# Patient Record
Sex: FEMALE | Race: WHITE | NOT HISPANIC OR LATINO | Employment: FULL TIME | URBAN - METROPOLITAN AREA
[De-identification: names, ages, dates, MRNs, and addresses within clinical notes are randomized per-mention and may not be internally consistent; named-entity substitution may affect disease eponyms.]

---

## 2018-05-23 ENCOUNTER — OFFICE VISIT (OUTPATIENT)
Dept: URGENT CARE | Facility: CLINIC | Age: 48
End: 2018-05-23
Payer: COMMERCIAL

## 2018-05-23 VITALS
HEIGHT: 61 IN | HEART RATE: 94 BPM | RESPIRATION RATE: 18 BRPM | WEIGHT: 150 LBS | DIASTOLIC BLOOD PRESSURE: 84 MMHG | BODY MASS INDEX: 28.32 KG/M2 | OXYGEN SATURATION: 98 % | SYSTOLIC BLOOD PRESSURE: 168 MMHG | TEMPERATURE: 97.8 F

## 2018-05-23 DIAGNOSIS — H61.22 IMPACTED CERUMEN OF LEFT EAR: Primary | ICD-10-CM

## 2018-05-23 PROCEDURE — 99283 EMERGENCY DEPT VISIT LOW MDM: CPT | Performed by: PHYSICIAN ASSISTANT

## 2018-05-23 PROCEDURE — G0382 LEV 3 HOSP TYPE B ED VISIT: HCPCS | Performed by: PHYSICIAN ASSISTANT

## 2018-05-23 RX ORDER — CHOLECALCIFEROL (VITAMIN D3) 125 MCG
3000 CAPSULE ORAL
COMMUNITY
End: 2020-02-26

## 2018-05-23 RX ORDER — FLUTICASONE PROPIONATE 50 MCG
1 SPRAY, SUSPENSION (ML) NASAL DAILY
COMMUNITY
End: 2020-02-26

## 2018-05-23 NOTE — PROGRESS NOTES
[facility]        NAME: Irvin Elias is a 50 y o  female  : 1970    MRN: [id number]  DATE: May 23, 2018  TIME: 1:08 PM    Assessment and Plan   Impacted cerumen of left ear [H61 22]  1  Impacted cerumen of left ear           Patient Instructions       Follow up with PCP in 3-5 days  Proceed to  ER if symptoms worsen  Chief Complaint     Chief Complaint   Patient presents with   Breanna Oms     left ear pain since last night with swelling         History of Present Illness       Ear pain and blocked up since last night      Earache    There is pain in the left ear  This is a new problem  The current episode started yesterday  The problem occurs constantly  The problem has been unchanged  There has been no fever  The pain is moderate  Associated symptoms include hearing loss  Pertinent negatives include no abdominal pain, coughing, diarrhea, ear discharge, headaches, neck pain, rash, rhinorrhea, sore throat or vomiting  Treatments tried: ear irrigation  The treatment provided no relief  Review of Systems   Review of Systems   Constitutional: Negative  HENT: Positive for ear pain and hearing loss  Negative for ear discharge, rhinorrhea and sore throat  Eyes: Negative  Respiratory: Negative  Negative for cough  Cardiovascular: Negative  Gastrointestinal: Negative for abdominal pain, diarrhea and vomiting  Musculoskeletal: Negative for neck pain  Skin: Negative  Negative for rash  Neurological: Negative for headaches           Current Medications       Current Outpatient Prescriptions:     fluticasone (FLONASE) 50 mcg/act nasal spray, 1 spray into each nostril daily, Disp: , Rfl:     lactase (LACTAID) 3,000 units tablet, Take 3,000 Units by mouth 3 (three) times a day with meals, Disp: , Rfl:     Current Allergies     Allergies as of 2018    (No Known Allergies)            The following portions of the patient's history were reviewed and updated as appropriate: allergies, current medications, past family history, past medical history, past social history, past surgical history and problem list      Past Medical History:   Diagnosis Date    Cervical cancer ([address] )        Past Surgical History:   Procedure Laterality Date    SHOULDER SURGERY Left        No family history on file  Medications have been verified  Objective   /84   Pulse 94   Temp 97 8 °F (36 6 °C) (Tympanic)   Resp 18   Ht 5' 1" (1 549 m)   Wt 68 kg (150 lb)   SpO2 98%   BMI 28 34 kg/m²          Physical Exam     Physical Exam   Constitutional: She is oriented to person, place, and time  She appears well-developed and well-nourished  No distress  HENT:   Head: Normocephalic and atraumatic  Right Ear: Hearing, tympanic membrane, external ear and ear canal normal    Left Ear: No lacerations  There is tenderness  No drainage or swelling  A foreign body (cerumin) is present  No mastoid tenderness  Tympanic membrane is not injected, not scarred, not perforated, not erythematous, not retracted and not bulging  No middle ear effusion  No hemotympanum  Decreased hearing is noted  Nose: Nose normal    Mouth/Throat: Oropharynx is clear and moist  No oropharyngeal exudate  Eyes: Conjunctivae are normal    Neck: Normal range of motion  Neck supple  Cardiovascular: Normal rate, regular rhythm, normal heart sounds and intact distal pulses  Pulmonary/Chest: Effort normal and breath sounds normal  No respiratory distress  Abdominal: Soft  Musculoskeletal: Normal range of motion  Lymphadenopathy:     She has no cervical adenopathy  Neurological: She is alert and oriented to person, place, and time  Skin: Skin is warm and dry  Psychiatric: She has a normal mood and affect  Nursing note and vitals reviewed              Ear cerumen removal  Date/Time: 5/23/2018 1:00 PM  Performed by: JENNIFER KRAMER  Authorized by: Jenaro Garcia     Indications / Diagnosis:  Jaguar Caballero impaction  Other Assisting Provider: No    Consent:     Consent obtained:  Verbal    Consent given by:  Patient    Risks discussed:  Bleeding, dizziness, incomplete removal, infection, pain and TM perforation    Alternatives discussed:  No treatment  Universal protocol:     Procedure explained and questions answered to patient or proxy's satisfaction: yes      Patient identity confirmed:  Verbally with patient  Procedure details:     Local anesthetic:  None    Location:  L ear    Procedure type: irrigation      Approach:  External    Visualization (free text):  Cerumin impacted    Equipment used:  Ear lavage  Post-procedure details:     Complication:  None    Hearing quality:  Normal    Patient tolerance of procedure:   Tolerated well, no immediate complications  Comments:      TM normal

## 2018-05-23 NOTE — PATIENT INSTRUCTIONS
Follow up with PCP in 3-5 days  Proceed to  ER if symptoms worsen  Cerumen Impaction   WHAT YOU NEED TO KNOW:   Cerumen impaction is the blockage of the outer ear canal by tightly packed cerumen (earwax)  It is generally treated with procedures such as flushing or suctioning the ear canal or the use of instruments to remove the impaction  DISCHARGE INSTRUCTIONS:   Medicines:  · Ear drops: These are used to soften the wax in your ear  Wax softening ear drops may be bought without a prescription  Ask your healthcare provider how often you should use this medicine  Read the instructions carefully before you use the ear drops  Do the following when you put in ear drops:     ¨ Warm the drops by holding the bottle in your hands for a few minutes  Cold ear drops may make you dizzy  ¨ Lie down with the affected ear toward the ceiling  You may also stand with your head tilted to one side  ¨ Pull your ear lobe up and back, and place the correct number of drops into the ear  ¨ Keep your ear facing up for 5 to 10 minutes so the drops coat the outer ear canal      ¨ Gently clean the outer part of the ear with a cotton swab  Do not  place the cotton swab or anything inside your ear canal  This increases the risk of damaging your eardrum  · Take your medicine as directed  Contact your healthcare provider if you think your medicine is not helping or if you have side effects  Tell him of her if you are allergic to any medicine  Keep a list of the medicines, vitamins, and herbs you take  Include the amounts, and when and why you take them  Bring the list or the pill bottles to follow-up visits  Carry your medicine list with you in case of an emergency  Follow up with your healthcare provider as directed:  Write down your questions so you remember to ask them during your visits  Contact your healthcare provider if:   · You have a fever  · You have trouble hearing or ringing in your ear      · You have questions about your condition or care  Return to the emergency department if:   · You feel dizzy  · You have discharge or blood coming out of your ear  · Your ear pain does not go away or gets worse  © 2017 [address] Information is for End User's use only and may not be sold, redistributed or otherwise used for commercial purposes  All illustrations and images included in CareNotes® are the copyrighted property of A D A M [employer] Inc  or Riccardo Rodriguez  The above information is an  only  It is not intended as medical advice for individual conditions or treatments  Talk to your doctor, nurse or pharmacist before following any medical regimen to see if it is safe and effective for you

## 2020-02-26 ENCOUNTER — OFFICE VISIT (OUTPATIENT)
Dept: FAMILY MEDICINE CLINIC | Facility: CLINIC | Age: 50
End: 2020-02-26

## 2020-02-26 VITALS
BODY MASS INDEX: 19.63 KG/M2 | TEMPERATURE: 99.9 F | HEIGHT: 61 IN | HEART RATE: 91 BPM | DIASTOLIC BLOOD PRESSURE: 78 MMHG | RESPIRATION RATE: 18 BRPM | WEIGHT: 104 LBS | OXYGEN SATURATION: 98 % | SYSTOLIC BLOOD PRESSURE: 138 MMHG

## 2020-02-26 DIAGNOSIS — Z12.11 SCREENING FOR MALIGNANT NEOPLASM OF COLON: ICD-10-CM

## 2020-02-26 DIAGNOSIS — I10 ESSENTIAL HYPERTENSION: ICD-10-CM

## 2020-02-26 DIAGNOSIS — Z12.39 SCREENING BREAST EXAMINATION: Primary | ICD-10-CM

## 2020-02-26 DIAGNOSIS — Z12.11 COLON CANCER SCREENING: ICD-10-CM

## 2020-02-26 DIAGNOSIS — Z11.59 ENCOUNTER FOR HEPATITIS C SCREENING TEST FOR LOW RISK PATIENT: ICD-10-CM

## 2020-02-26 PROCEDURE — 3075F SYST BP GE 130 - 139MM HG: CPT | Performed by: NURSE PRACTITIONER

## 2020-02-26 PROCEDURE — 3078F DIAST BP <80 MM HG: CPT | Performed by: NURSE PRACTITIONER

## 2020-02-26 PROCEDURE — 3008F BODY MASS INDEX DOCD: CPT | Performed by: NURSE PRACTITIONER

## 2020-02-26 PROCEDURE — 99203 OFFICE O/P NEW LOW 30 MIN: CPT | Performed by: NURSE PRACTITIONER

## 2020-02-26 RX ORDER — LISINOPRIL 5 MG/1
10 TABLET ORAL DAILY
Qty: 30 TABLET | Refills: 2 | Status: SHIPPED | OUTPATIENT
Start: 2020-02-26 | End: 2020-02-26 | Stop reason: SDUPTHER

## 2020-02-26 RX ORDER — ASPIRIN 325 MG
325 TABLET ORAL DAILY
COMMUNITY

## 2020-02-26 RX ORDER — LISINOPRIL 5 MG/1
10 TABLET ORAL DAILY
Qty: 30 TABLET | Refills: 2 | Status: ON HOLD | OUTPATIENT
Start: 2020-02-26 | End: 2021-09-16 | Stop reason: ALTCHOICE

## 2020-02-26 NOTE — PROGRESS NOTES
[facility]        NAME: Alexx Jackson is a 44 y o  female  : 1970    MRN: [id number]  DATE: 2020  TIME: 3:55 PM    Assessment and Plan   Screening breast examination [Z12 39]  1  Screening breast examination  Mammo screening bilateral w 3d & cad   2  Screening for malignant neoplasm of colon     3  Colon cancer screening  Ambulatory referral to Gastroenterology   4  Encounter for hepatitis C screening test for low risk patient     5  Essential hypertension  Comprehensive metabolic panel    Lipid panel    lisinopril (ZESTRIL) 5 mg tablet     Agrees to colonoscopy in the fall once she has insurance  Agrees to mammogram once she has insurance in the fall  Forms completed for [employer]  Patient Instructions     Patient Instructions   Start lisinopril today  Follow up for nurse visit BP check  Get mammogram and colonoscopy done once you have insurance  Chief Complaint     Chief Complaint   Patient presents with   [address]    Hypertension         History of Present Illness       Patient here with c/o HTN  Was seen in urgent care for a drivers permit physical but patient had elevated BP at the visit  Was given forms by the [facility] to be filled ou for clearance  Denies HA, vision changes, dizziness, blurred vision, CP    Hypertension   This is a new problem  Pertinent negatives include no anxiety, blurred vision, chest pain, headaches, neck pain, orthopnea, palpitations, shortness of breath or sweats  Risk factors for coronary artery disease include family history  Past treatments include nothing  There are no compliance problems  Review of Systems   Review of Systems   Constitutional: Negative for activity change, appetite change, chills, fatigue and fever  HENT: Negative for congestion, ear pain, nosebleeds, rhinorrhea and sore throat  Eyes: Negative for blurred vision, photophobia, pain, redness and visual disturbance     Respiratory: Negative for cough, shortness of breath and wheezing  Cardiovascular: Negative  Negative for chest pain, palpitations and orthopnea  Gastrointestinal: Negative  Negative for abdominal pain, constipation, diarrhea and vomiting  Endocrine: Negative  Genitourinary: Negative for difficulty urinating, dysuria and flank pain  Musculoskeletal: Negative  Negative for neck pain  Skin: Negative for color change and rash  Neurological: Negative for dizziness, weakness, numbness and headaches  Hematological: Negative for adenopathy  Psychiatric/Behavioral: Negative for agitation and confusion  The patient is not nervous/anxious  PHQ-9 Depression Screening    PHQ-9:    Frequency of the following problems over the past two weeks:       Little interest or pleasure in doing things:  0 - not at all  Feeling down, depressed, or hopeless:  0 - not at all  PHQ-2 Score:  0        Current Medications       Current Outpatient Medications:     aspirin 325 mg tablet, Take 325 mg by mouth daily, Disp: , Rfl:     lisinopril (ZESTRIL) 5 mg tablet, Take 2 tablets (10 mg total) by mouth daily, Disp: 30 tablet, Rfl: 2    Current Allergies     Allergies as of 02/26/2020    (No Known Allergies)            The following portions of the patient's history were reviewed and updated as appropriate: allergies, current medications, past family history, past medical history, past social history, past surgical history and problem list      Past Medical History:   Diagnosis Date    Cervical [address] [address] )        Past Surgical History:   Procedure Laterality Date    SHOULDER SURGERY Left        History reviewed  No pertinent family history  Medications have been verified  Objective   /78   Pulse 91   Temp 99 9 °F (37 7 °C)   Resp 18   Ht 5' 1" (1 549 m)   Wt 47 2 kg (104 lb)   SpO2 98%   BMI 19 65 kg/m²        Physical Exam     Physical Exam   Constitutional: She is oriented to person, place, and time  She appears well-developed and well-nourished  She is cooperative  She does not appear ill  No distress  Eyes: Lids are normal    Cardiovascular: Normal rate, regular rhythm, S1 normal, S2 normal, normal heart sounds and intact distal pulses  Exam reveals no gallop and no friction rub  No murmur heard  Pulmonary/Chest: Effort normal and breath sounds normal  No respiratory distress  She has no decreased breath sounds  She has no wheezes  Abdominal: Normal appearance  Musculoskeletal: Normal range of motion  She exhibits no edema, tenderness or deformity  Neurological: She is alert and oriented to person, place, and time  Skin: Skin is warm  No rash noted  No erythema  Psychiatric: She has a normal mood and affect  Her behavior is normal  Thought content normal    Nursing note and vitals reviewed

## 2020-02-26 NOTE — PATIENT INSTRUCTIONS
Start lisinopril today  Follow up for nurse visit BP check  Get mammogram and colonoscopy done once you have insurance

## 2021-09-15 ENCOUNTER — APPOINTMENT (EMERGENCY)
Dept: CT IMAGING | Facility: HOSPITAL | Age: 51
DRG: 204 | End: 2021-09-15
Payer: COMMERCIAL

## 2021-09-15 ENCOUNTER — HOSPITAL ENCOUNTER (INPATIENT)
Facility: HOSPITAL | Age: 51
LOS: 1 days | Discharge: HOME | DRG: 204 | End: 2021-09-17
Attending: INTERNAL MEDICINE | Admitting: INTERNAL MEDICINE
Payer: COMMERCIAL

## 2021-09-15 DIAGNOSIS — S09.90XA HEAD INJURY: ICD-10-CM

## 2021-09-15 DIAGNOSIS — S06.0X9A CONCUSSION: ICD-10-CM

## 2021-09-15 DIAGNOSIS — E83.42 HYPOMAGNESEMIA: ICD-10-CM

## 2021-09-15 DIAGNOSIS — E87.6 HYPOKALEMIA: Primary | ICD-10-CM

## 2021-09-15 LAB
ANION GAP SERPL CALCULATED.3IONS-SCNC: 10 MMOL/L (ref 4–13)
APTT PPP: 26 SECONDS (ref 23–37)
BASOPHILS # BLD AUTO: 0 THOUSANDS/ΜL (ref 0–0.1)
BASOPHILS NFR BLD AUTO: 1 % (ref 0–2)
BUN SERPL-MCNC: 6 MG/DL (ref 7–25)
CALCIUM SERPL-MCNC: 8.2 MG/DL (ref 8.6–10.5)
CHLORIDE SERPL-SCNC: 97 MMOL/L (ref 98–107)
CO2 SERPL-SCNC: 24 MMOL/L (ref 21–31)
CREAT SERPL-MCNC: 0.63 MG/DL (ref 0.6–1.2)
EOSINOPHIL # BLD AUTO: 0.1 THOUSAND/ΜL (ref 0–0.61)
EOSINOPHIL NFR BLD AUTO: 1 % (ref 0–5)
ERYTHROCYTE [DISTWIDTH] IN BLOOD BY AUTOMATED COUNT: 13.4 % (ref 11.5–14.5)
GFR SERPL CREATININE-BSD FRML MDRD: 103 ML/MIN/1.73SQ M
GLUCOSE SERPL-MCNC: 182 MG/DL (ref 65–99)
HCT VFR BLD AUTO: 33.5 % (ref 42–47)
HGB BLD-MCNC: 11.4 G/DL (ref 12–16)
INR PPP: 1.01 (ref 0.84–1.19)
LYMPHOCYTES # BLD AUTO: 2.1 THOUSANDS/ΜL (ref 0.6–4.47)
LYMPHOCYTES NFR BLD AUTO: 27 % (ref 21–51)
MAGNESIUM SERPL-MCNC: 1 MG/DL (ref 1.9–2.7)
MCH RBC QN AUTO: 32.4 PG (ref 26–34)
MCHC RBC AUTO-ENTMCNC: 34.2 G/DL (ref 31–37)
MCV RBC AUTO: 95 FL (ref 81–99)
MONOCYTES # BLD AUTO: 0.7 THOUSAND/ΜL (ref 0.17–1.22)
MONOCYTES NFR BLD AUTO: 9 % (ref 2–12)
NEUTROPHILS # BLD AUTO: 4.9 THOUSANDS/ΜL (ref 1.4–6.5)
NEUTS SEG NFR BLD AUTO: 62 % (ref 42–75)
PLATELET # BLD AUTO: 152 THOUSANDS/UL (ref 149–390)
PMV BLD AUTO: 8.2 FL (ref 8.6–11.7)
POTASSIUM SERPL-SCNC: 2.5 MMOL/L (ref 3.5–5.5)
PROTHROMBIN TIME: 13.4 SECONDS (ref 11.6–14.5)
RBC # BLD AUTO: 3.53 MILLION/UL (ref 3.9–5.2)
SODIUM SERPL-SCNC: 131 MMOL/L (ref 134–143)
WBC # BLD AUTO: 7.9 THOUSAND/UL (ref 4.8–10.8)

## 2021-09-15 PROCEDURE — 99285 EMERGENCY DEPT VISIT HI MDM: CPT | Performed by: INTERNAL MEDICINE

## 2021-09-15 PROCEDURE — 99285 EMERGENCY DEPT VISIT HI MDM: CPT

## 2021-09-15 PROCEDURE — 93005 ELECTROCARDIOGRAM TRACING: CPT

## 2021-09-15 PROCEDURE — 70450 CT HEAD/BRAIN W/O DYE: CPT

## 2021-09-15 PROCEDURE — 96360 HYDRATION IV INFUSION INIT: CPT

## 2021-09-15 PROCEDURE — 96361 HYDRATE IV INFUSION ADD-ON: CPT

## 2021-09-15 PROCEDURE — 86140 C-REACTIVE PROTEIN: CPT | Performed by: PHYSICIAN ASSISTANT

## 2021-09-15 PROCEDURE — 85025 COMPLETE CBC W/AUTO DIFF WBC: CPT | Performed by: INTERNAL MEDICINE

## 2021-09-15 PROCEDURE — 83735 ASSAY OF MAGNESIUM: CPT | Performed by: INTERNAL MEDICINE

## 2021-09-15 PROCEDURE — 87186 SC STD MICRODIL/AGAR DIL: CPT | Performed by: INTERNAL MEDICINE

## 2021-09-15 PROCEDURE — 85730 THROMBOPLASTIN TIME PARTIAL: CPT | Performed by: INTERNAL MEDICINE

## 2021-09-15 PROCEDURE — 81001 URINALYSIS AUTO W/SCOPE: CPT | Performed by: INTERNAL MEDICINE

## 2021-09-15 PROCEDURE — 80307 DRUG TEST PRSMV CHEM ANLYZR: CPT | Performed by: INTERNAL MEDICINE

## 2021-09-15 PROCEDURE — 87086 URINE CULTURE/COLONY COUNT: CPT | Performed by: INTERNAL MEDICINE

## 2021-09-15 PROCEDURE — 72125 CT NECK SPINE W/O DYE: CPT

## 2021-09-15 PROCEDURE — 85610 PROTHROMBIN TIME: CPT | Performed by: INTERNAL MEDICINE

## 2021-09-15 PROCEDURE — 87077 CULTURE AEROBIC IDENTIFY: CPT | Performed by: INTERNAL MEDICINE

## 2021-09-15 PROCEDURE — 80048 BASIC METABOLIC PNL TOTAL CA: CPT | Performed by: INTERNAL MEDICINE

## 2021-09-15 PROCEDURE — 36415 COLL VENOUS BLD VENIPUNCTURE: CPT | Performed by: INTERNAL MEDICINE

## 2021-09-15 RX ORDER — POTASSIUM CHLORIDE 20 MEQ/1
20 TABLET, EXTENDED RELEASE ORAL ONCE
Status: COMPLETED | OUTPATIENT
Start: 2021-09-15 | End: 2021-09-16

## 2021-09-15 RX ORDER — POTASSIUM CHLORIDE 14.9 MG/ML
20 INJECTION INTRAVENOUS ONCE
Status: COMPLETED | OUTPATIENT
Start: 2021-09-15 | End: 2021-09-16

## 2021-09-15 RX ORDER — MAGNESIUM SULFATE HEPTAHYDRATE 40 MG/ML
2 INJECTION, SOLUTION INTRAVENOUS ONCE
Status: COMPLETED | OUTPATIENT
Start: 2021-09-16 | End: 2021-09-16

## 2021-09-15 RX ADMIN — SODIUM CHLORIDE 1000 ML: 0.9 INJECTION, SOLUTION INTRAVENOUS at 22:25

## 2021-09-16 PROBLEM — E87.6 HYPOKALEMIA: Status: ACTIVE | Noted: 2021-09-16

## 2021-09-16 PROBLEM — R19.7 DIARRHEA: Status: ACTIVE | Noted: 2021-09-16

## 2021-09-16 PROBLEM — Z72.0 TOBACCO ABUSE: Chronic | Status: ACTIVE | Noted: 2021-09-16

## 2021-09-16 PROBLEM — R55 SYNCOPE: Status: ACTIVE | Noted: 2021-09-16

## 2021-09-16 PROBLEM — E87.1 HYPONATREMIA: Status: ACTIVE | Noted: 2021-09-16

## 2021-09-16 PROBLEM — E83.42 HYPOMAGNESEMIA: Status: ACTIVE | Noted: 2021-09-16

## 2021-09-16 LAB
AMPHETAMINES SERPL QL SCN: NEGATIVE
ANION GAP SERPL CALCULATED.3IONS-SCNC: 8 MMOL/L (ref 4–13)
BACTERIA UR QL AUTO: ABNORMAL /HPF
BARBITURATES UR QL: NEGATIVE
BASOPHILS # BLD AUTO: 0.1 THOUSANDS/ΜL (ref 0–0.1)
BASOPHILS NFR BLD AUTO: 1 % (ref 0–2)
BENZODIAZ UR QL: NEGATIVE
BILIRUB UR QL STRIP: NEGATIVE
BUN SERPL-MCNC: 3 MG/DL (ref 7–25)
CALCIUM SERPL-MCNC: 7.7 MG/DL (ref 8.6–10.5)
CHLORIDE SERPL-SCNC: 107 MMOL/L (ref 98–107)
CLARITY UR: CLEAR
CO2 SERPL-SCNC: 23 MMOL/L (ref 21–31)
COCAINE UR QL: NEGATIVE
COLOR UR: YELLOW
CREAT SERPL-MCNC: 0.43 MG/DL (ref 0.6–1.2)
CRP SERPL QL: <5 MG/L
EOSINOPHIL # BLD AUTO: 0.3 THOUSAND/ΜL (ref 0–0.61)
EOSINOPHIL NFR BLD AUTO: 3 % (ref 0–5)
ERYTHROCYTE [DISTWIDTH] IN BLOOD BY AUTOMATED COUNT: 13.6 % (ref 11.5–14.5)
GFR SERPL CREATININE-BSD FRML MDRD: 117 ML/MIN/1.73SQ M
GLUCOSE P FAST SERPL-MCNC: 96 MG/DL (ref 65–99)
GLUCOSE SERPL-MCNC: 96 MG/DL (ref 65–99)
GLUCOSE UR STRIP-MCNC: NEGATIVE MG/DL
HCT VFR BLD AUTO: 31.1 % (ref 42–47)
HGB BLD-MCNC: 10.8 G/DL (ref 12–16)
HGB UR QL STRIP.AUTO: ABNORMAL
HYALINE CASTS #/AREA URNS LPF: ABNORMAL /LPF
KETONES UR STRIP-MCNC: ABNORMAL MG/DL
LEUKOCYTE ESTERASE UR QL STRIP: ABNORMAL
LYMPHOCYTES # BLD AUTO: 3.7 THOUSANDS/ΜL (ref 0.6–4.47)
LYMPHOCYTES NFR BLD AUTO: 40 % (ref 21–51)
MAGNESIUM SERPL-MCNC: 1.7 MG/DL (ref 1.9–2.7)
MCH RBC QN AUTO: 32.8 PG (ref 26–34)
MCHC RBC AUTO-ENTMCNC: 34.8 G/DL (ref 31–37)
MCV RBC AUTO: 94 FL (ref 81–99)
METHADONE UR QL: NEGATIVE
MONOCYTES # BLD AUTO: 0.7 THOUSAND/ΜL (ref 0.17–1.22)
MONOCYTES NFR BLD AUTO: 7 % (ref 2–12)
NEUTROPHILS # BLD AUTO: 4.5 THOUSANDS/ΜL (ref 1.4–6.5)
NEUTS SEG NFR BLD AUTO: 49 % (ref 42–75)
NITRITE UR QL STRIP: NEGATIVE
NON-SQ EPI CELLS URNS QL MICRO: ABNORMAL /HPF
OPIATES UR QL SCN: NEGATIVE
OXYCODONE+OXYMORPHONE UR QL SCN: NEGATIVE
PCP UR QL: NEGATIVE
PH UR STRIP.AUTO: 6 [PH]
PHOSPHATE SERPL-MCNC: 2 MG/DL (ref 3–5.5)
PLATELET # BLD AUTO: 153 THOUSANDS/UL (ref 149–390)
PMV BLD AUTO: 8.2 FL (ref 8.6–11.7)
POTASSIUM SERPL-SCNC: 3.1 MMOL/L (ref 3.5–5.5)
PROT UR STRIP-MCNC: NEGATIVE MG/DL
RBC # BLD AUTO: 3.3 MILLION/UL (ref 3.9–5.2)
RBC #/AREA URNS AUTO: ABNORMAL /HPF
SODIUM SERPL-SCNC: 138 MMOL/L (ref 134–143)
SP GR UR STRIP.AUTO: 1.01 (ref 1–1.03)
THC UR QL: NEGATIVE
TROPONIN I SERPL-MCNC: <0.03 NG/ML
UROBILINOGEN UR QL STRIP.AUTO: 0.2 E.U./DL
WBC # BLD AUTO: 9.2 THOUSAND/UL (ref 4.8–10.8)
WBC #/AREA URNS AUTO: ABNORMAL /HPF

## 2021-09-16 PROCEDURE — 80048 BASIC METABOLIC PNL TOTAL CA: CPT | Performed by: PHYSICIAN ASSISTANT

## 2021-09-16 PROCEDURE — 36415 COLL VENOUS BLD VENIPUNCTURE: CPT | Performed by: PHYSICIAN ASSISTANT

## 2021-09-16 PROCEDURE — 93005 ELECTROCARDIOGRAM TRACING: CPT

## 2021-09-16 PROCEDURE — 84484 ASSAY OF TROPONIN QUANT: CPT | Performed by: PHYSICIAN ASSISTANT

## 2021-09-16 PROCEDURE — 87493 C DIFF AMPLIFIED PROBE: CPT | Performed by: PHYSICIAN ASSISTANT

## 2021-09-16 PROCEDURE — RECHECK: Performed by: INTERNAL MEDICINE

## 2021-09-16 PROCEDURE — 85025 COMPLETE CBC W/AUTO DIFF WBC: CPT | Performed by: PHYSICIAN ASSISTANT

## 2021-09-16 PROCEDURE — 99219 PR INITIAL OBSERVATION CARE/DAY 50 MINUTES: CPT | Performed by: INTERNAL MEDICINE

## 2021-09-16 PROCEDURE — 87505 NFCT AGENT DETECTION GI: CPT | Performed by: PHYSICIAN ASSISTANT

## 2021-09-16 PROCEDURE — 83735 ASSAY OF MAGNESIUM: CPT | Performed by: PHYSICIAN ASSISTANT

## 2021-09-16 PROCEDURE — 99220 PR INITIAL OBSERVATION CARE/DAY 70 MINUTES: CPT | Performed by: INTERNAL MEDICINE

## 2021-09-16 PROCEDURE — 84100 ASSAY OF PHOSPHORUS: CPT | Performed by: PHYSICIAN ASSISTANT

## 2021-09-16 RX ORDER — ONDANSETRON 2 MG/ML
4 INJECTION INTRAMUSCULAR; INTRAVENOUS EVERY 6 HOURS PRN
Status: DISCONTINUED | OUTPATIENT
Start: 2021-09-16 | End: 2021-09-17 | Stop reason: HOSPADM

## 2021-09-16 RX ORDER — MAGNESIUM SULFATE HEPTAHYDRATE 40 MG/ML
2 INJECTION, SOLUTION INTRAVENOUS ONCE
Status: COMPLETED | OUTPATIENT
Start: 2021-09-16 | End: 2021-09-16

## 2021-09-16 RX ORDER — NICOTINE 21 MG/24HR
1 PATCH, TRANSDERMAL 24 HOURS TRANSDERMAL DAILY
Status: DISCONTINUED | OUTPATIENT
Start: 2021-09-16 | End: 2021-09-17 | Stop reason: HOSPADM

## 2021-09-16 RX ORDER — SODIUM CHLORIDE AND POTASSIUM CHLORIDE .9; .15 G/100ML; G/100ML
125 SOLUTION INTRAVENOUS CONTINUOUS
Status: DISCONTINUED | OUTPATIENT
Start: 2021-09-16 | End: 2021-09-17

## 2021-09-16 RX ORDER — FOLIC ACID/MULTIVIT,IRON,MINER .4-18-35
1 TABLET,CHEWABLE ORAL DAILY
COMMUNITY

## 2021-09-16 RX ORDER — POTASSIUM CHLORIDE 20 MEQ/1
40 TABLET, EXTENDED RELEASE ORAL ONCE
Status: COMPLETED | OUTPATIENT
Start: 2021-09-16 | End: 2021-09-16

## 2021-09-16 RX ORDER — ASPIRIN 325 MG
325 TABLET ORAL DAILY
Status: DISCONTINUED | OUTPATIENT
Start: 2021-09-16 | End: 2021-09-17 | Stop reason: HOSPADM

## 2021-09-16 RX ORDER — LISINOPRIL 10 MG/1
10 TABLET ORAL DAILY
Status: DISCONTINUED | OUTPATIENT
Start: 2021-09-16 | End: 2021-09-17 | Stop reason: HOSPADM

## 2021-09-16 RX ORDER — HEPARIN SODIUM 5000 [USP'U]/ML
5000 INJECTION, SOLUTION INTRAVENOUS; SUBCUTANEOUS EVERY 12 HOURS SCHEDULED
Status: DISCONTINUED | OUTPATIENT
Start: 2021-09-16 | End: 2021-09-17 | Stop reason: HOSPADM

## 2021-09-16 RX ORDER — ACETAMINOPHEN 325 MG/1
650 TABLET ORAL EVERY 6 HOURS PRN
Status: DISCONTINUED | OUTPATIENT
Start: 2021-09-16 | End: 2021-09-17 | Stop reason: HOSPADM

## 2021-09-16 RX ADMIN — SODIUM CHLORIDE AND POTASSIUM CHLORIDE 125 ML/HR: .9; .15 SOLUTION INTRAVENOUS at 02:09

## 2021-09-16 RX ADMIN — HEPARIN SODIUM 5000 UNITS: 5000 INJECTION INTRAVENOUS; SUBCUTANEOUS at 02:08

## 2021-09-16 RX ADMIN — POTASSIUM CHLORIDE 20 MEQ: 14.9 INJECTION, SOLUTION INTRAVENOUS at 00:26

## 2021-09-16 RX ADMIN — MAGNESIUM SULFATE IN WATER 2 G: 40 INJECTION, SOLUTION INTRAVENOUS at 12:04

## 2021-09-16 RX ADMIN — MAGNESIUM OXIDE TAB 400 MG (241.3 MG ELEMENTAL MG) 400 MG: 400 (241.3 MG) TAB at 17:38

## 2021-09-16 RX ADMIN — SODIUM CHLORIDE AND POTASSIUM CHLORIDE 125 ML/HR: .9; .15 SOLUTION INTRAVENOUS at 17:25

## 2021-09-16 RX ADMIN — ASPIRIN 325 MG: 325 TABLET ORAL at 09:47

## 2021-09-16 RX ADMIN — NICOTINE 1 PATCH: 21 PATCH, EXTENDED RELEASE TRANSDERMAL at 02:07

## 2021-09-16 RX ADMIN — MAGNESIUM SULFATE IN WATER 2 G: 40 INJECTION, SOLUTION INTRAVENOUS at 00:26

## 2021-09-16 RX ADMIN — POTASSIUM CHLORIDE 40 MEQ: 1500 TABLET, EXTENDED RELEASE ORAL at 12:01

## 2021-09-16 RX ADMIN — ACETAMINOPHEN 650 MG: 325 TABLET ORAL at 17:38

## 2021-09-16 RX ADMIN — NICOTINE 1 PATCH: 21 PATCH, EXTENDED RELEASE TRANSDERMAL at 18:11

## 2021-09-16 RX ADMIN — POTASSIUM CHLORIDE 20 MEQ: 1500 TABLET, EXTENDED RELEASE ORAL at 00:25

## 2021-09-16 RX ADMIN — HEPARIN SODIUM 5000 UNITS: 5000 INJECTION INTRAVENOUS; SUBCUTANEOUS at 21:45

## 2021-09-16 RX ADMIN — MAGNESIUM OXIDE TAB 400 MG (241.3 MG ELEMENTAL MG) 400 MG: 400 (241.3 MG) TAB at 12:03

## 2021-09-16 RX ADMIN — LISINOPRIL 10 MG: 10 TABLET ORAL at 09:47

## 2021-09-16 NOTE — PLAN OF CARE
Problem: Nutrition/Hydration-ADULT  Goal: Nutrient/Hydration intake appropriate for improving, restoring or maintaining nutritional needs  Description: Monitor and assess patient's nutrition/hydration status for malnutrition  Collaborate with interdisciplinary team and initiate plan and interventions as ordered  Monitor patient's weight and dietary intake as ordered or per policy  Utilize nutrition screening tool and intervene as necessary  Determine patient's food preferences and provide high-protein, high-caloric foods as appropriate       INTERVENTIONS:  - Monitor oral intake, urinary output, labs, and treatment plans  - Assess nutrition and hydration status and recommend course of action  - Evaluate amount of meals eaten  - Assist patient with eating if necessary   - Allow adequate time for meals  - Recommend/ encourage appropriate diets, oral nutritional supplements, and vitamin/mineral supplements  - Order, calculate, and assess calorie counts as needed  - Recommend, monitor, and adjust tube feedings and TPN/PPN based on assessed needs  - Assess need for intravenous fluids  - Provide specific nutrition/hydration education as appropriate  - Include patient/family/caregiver in decisions related to nutrition  Outcome: Progressing     Problem: PAIN - ADULT  Goal: Verbalizes/displays adequate comfort level or baseline comfort level  Description: Interventions:  - Encourage patient to monitor pain and request assistance  - Assess pain using appropriate pain scale  - Administer analgesics based on type and severity of pain and evaluate response  - Implement non-pharmacological measures as appropriate and evaluate response  - Consider cultural and social influences on pain and pain management  - Notify physician/advanced practitioner if interventions unsuccessful or patient reports new pain  Outcome: Progressing     Problem: INFECTION - ADULT  Goal: Absence or prevention of progression during hospitalization  Description: INTERVENTIONS:  - Assess and monitor for signs and symptoms of infection  - Monitor lab/diagnostic results  - Monitor all insertion sites, i e  indwelling lines, tubes, and drains  - Monitor endotracheal if appropriate and nasal secretions for changes in amount and color  - [place] appropriate cooling/warming therapies per order  - Administer medications as ordered  - Instruct and encourage patient and family to use good hand hygiene technique  - Identify and instruct in appropriate isolation precautions for identified infection/condition  Outcome: Progressing  Goal: Absence of fever/infection during neutropenic period  Description: INTERVENTIONS:  - Monitor WBC    Outcome: Progressing    Goal: Maintain or return to baseline ADL function  Description: INTERVENTIONS:  -  Assess patient's ability to carry out ADLs; assess patient's baseline for ADL function and identify physical deficits which impact ability to perform ADLs (bathing, care of mouth/teeth, toileting, grooming, dressing, etc )  - Assess/evaluate cause of self-care deficits   - Assess range of motion  - Assess patient's mobility; develop plan if impaired  - Assess patient's need for assistive devices and provide as appropriate  - Encourage maximum independence but intervene and supervise when necessary  - Involve family in performance of ADLs  - Assess for home care needs following discharge   - Consider OT consult to assist with ADL evaluation and planning for discharge  - Provide patient education as appropriate  Outcome: Progressing       Problem: DISCHARGE PLANNING  Goal: Discharge to home or other facility with appropriate resources  Description: INTERVENTIONS:  - Identify barriers to discharge w/patient and caregiver  - Arrange for needed discharge resources and transportation as appropriate  - Identify discharge learning needs (meds, wound care, etc )  - Arrange for interpretive services to assist at discharge as needed  - Refer to Case Management Department for coordinating discharge planning if the patient needs post-hospital services based on physician/advanced practitioner order or complex needs related to functional status, cognitive ability, or social support system  Outcome: Progressing     Problem: Knowledge Deficit  Goal: Patient/family/caregiver demonstrates understanding of disease process, treatment plan, medications, and discharge instructions  Description: Complete learning assessment and assess knowledge base    Interventions:  - Provide teaching at level of understanding  - Provide teaching via preferred learning methods  Outcome: Progressing     Problem: CARDIOVASCULAR - ADULT  Goal: Maintains optimal cardiac output and hemodynamic stability  Description: INTERVENTIONS:  - Monitor I/O, vital signs and rhythm  - Monitor for S/S and trends of decreased cardiac output  - Administer and titrate ordered vasoactive medications to optimize hemodynamic stability  - Assess quality of pulses, skin color and temperature  - Assess for signs of decreased coronary artery perfusion  - Instruct patient to report change in severity of symptoms  Outcome: Progressing  Goal: Absence of cardiac dysrhythmias or at baseline rhythm  Description: INTERVENTIONS:  - Continuous cardiac monitoring, vital signs, obtain 12 lead EKG if ordered  - Administer antiarrhythmic and heart rate control medications as ordered  - Monitor electrolytes and administer replacement therapy as ordered  Outcome: Progressing     Problem: GASTROINTESTINAL - ADULT  Goal: Minimal or absence of nausea and/or vomiting  Description: INTERVENTIONS:  - Administer IV fluids if ordered to ensure adequate hydration  - Maintain NPO status until nausea and vomiting are resolved  - Nasogastric tube if ordered  - Administer ordered antiemetic medications as needed  - Provide nonpharmacologic comfort measures as appropriate  - Advance diet as tolerated, if ordered  - Consider nutrition services referral to assist patient with adequate nutrition and appropriate food choices  Outcome: Progressing  Goal: Maintains or returns to baseline bowel function  Description: INTERVENTIONS:  - Assess bowel function  - Encourage oral fluids to ensure adequate hydration  - Administer IV fluids if ordered to ensure adequate hydration  - Administer ordered medications as needed  - Encourage mobilization and activity  - Consider nutritional services referral to assist patient with adequate nutrition and appropriate food choices  Outcome: Progressing  Goal: Maintains adequate nutritional intake  Description: INTERVENTIONS:  - Monitor percentage of each meal consumed  - Identify factors contributing to decreased intake, treat as appropriate  - Assist with meals as needed  - Monitor I&O, weight, and lab values if indicated  - Obtain nutrition services referral as needed  Outcome: Progressing  Goal: Oral mucous membranes remain intact  Description: INTERVENTIONS  - Assess oral mucosa and hygiene practices  - Implement preventative oral hygiene regimen  - Implement oral medicated treatments as ordered  - Initiate Nutrition services referral as needed  Outcome: Progressing     Problem: METABOLIC, FLUID AND ELECTROLYTES - ADULT  Goal: Electrolytes maintained within normal limits  Description: INTERVENTIONS:  - Monitor labs and assess patient for signs and symptoms of electrolyte imbalances  - Administer electrolyte replacement as ordered  - Monitor response to electrolyte replacements, including repeat lab results as appropriate  - Instruct patient on fluid and nutrition as appropriate  Outcome: Progressing  Goal: Fluid balance maintained  Description: INTERVENTIONS:  - Monitor labs   - Monitor I/O and WT  - Instruct patient on fluid and nutrition as appropriate  - Assess for signs & symptoms of volume excess or deficit  Outcome: Progressing  Goal: Glucose maintained within target range  Description: INTERVENTIONS:  - Monitor Blood Glucose as ordered  - Assess for signs and symptoms of hyperglycemia and hypoglycemia  - Administer ordered medications to maintain glucose within target range  - Assess nutritional intake and initiate nutrition service referral as needed  Outcome: Progressing          Problem: HEMATOLOGIC - ADULT  Goal: Maintains hematologic stability  Description: INTERVENTIONS  - Assess for signs and symptoms of bleeding or hemorrhage  - Monitor labs  - Administer supportive blood products/factors as ordered and appropriate  Outcome: Progressing     Problem: MUSCULOSKELETAL - ADULT  Goal: Maintain or return mobility to safest level of function  Description: INTERVENTIONS:  - Assess patient's ability to carry out ADLs; assess patient's baseline for ADL function and identify physical deficits which impact ability to perform ADLs (bathing, care of mouth/teeth, toileting, grooming, dressing, etc )  - Assess/evaluate cause of self-care deficits   - Assess range of motion  - Assess patient's mobility  - Assess patient's need for assistive devices and provide as appropriate  - Encourage maximum independence but intervene and supervise when necessary  - Involve family in performance of ADLs  - Assess for home care needs following discharge   - Consider OT consult to assist with ADL evaluation and planning for discharge  - Provide patient education as appropriate  Outcome: Progressing

## 2021-09-16 NOTE — ASSESSMENT & PLAN NOTE
Patient is status post IV repletion in the ER, will continue to monitor with repeat labs in a m     9/8/21: will start on daily magnesium

## 2021-09-16 NOTE — ASSESSMENT & PLAN NOTE
· Placed in observation telemetry  · Likely secondary to GI losses  · Patient is status post p o   As well as IV repletion in the ER  · Will place on telemetry  · Will hydrate with normal saline with 20 mEq of KCl at 125 cc/hour

## 2021-09-16 NOTE — ASSESSMENT & PLAN NOTE
· Will place on telemetry  · Trend cardiac enzymes  · Obtain serial EKGs  · Obtain orthostatic vital signs Q shift    9/8/21: positive orthostatics on my exam, will continue to replenish fluid with iv hydration, also requiring IV repletion, will need further inpatient stay  -no major events on telemetry  -trop neg x3  -orthostatic positive-continue fluid hydration

## 2021-09-16 NOTE — UTILIZATION REVIEW
Initial Clinical Review  Admission Orders (From admission, onward)     Ordered        09/16/21 1428  Inpatient Admission  Once         09/16/21 0014  Place in Observation  Once                   9/17   CHANGED to INPT  STATUS   Due to  Continuation of  Diarrhea,  Pt requires ongoing IVF and monitoring/repletion of  K+ and Mg     Inpatient Admission Once     Transfer Service: Hospitalist       Question Answer   Level of Care Med Surg   Estimated length of stay More than 2 Midnights   Certification I certify that inpatient services are medically necessary for this patient for a duration of greater than two midnights  See H&P and MD Progress Notes for additional information about the patient's course of treatment  ED Arrival Information     Expected Arrival Acuity    - 9/15/2021 21:46 Emergent         Means of arrival Escorted by Service Admission type    Ambulance [facility] Emergency         Arrival complaint    HEAD LACERATION        Chief Complaint   Patient presents with    Fall     trauma evAL  pt was found on sidewalk by coworker  unwittnessed  pt states she has had abdominal pain x 3 weeks and diarrhea for about 3 weeks       Initial Presentation:      44 y o  female who presents with syncope x1 this evening  Patient presents ER for further evaluation treatment of her syncopal episode x1 this evening  Patient states that she was sitting on the curb waiting for a ride smoking of cigarettes that was the last thing she recalls before her friend was standing over her and there was an ambulance there      Patient states that she has had ongoing diarrhea for the past 2 months which recently became worse approximately 1 month ago when she states that she has been having up to 5 bowel movements a day there accompanied with mucus but no bright red blood per rectum    Patient does not currently follow with GI has never had a colonoscopy      Patient denies any lightheadedness, dizziness, chest pain, palpitations or shortness of breath prior to her syncopal episode and she is unsure how long she was unconscious  Patient denies any further trauma  Will admit for monitoring and repletion of  Electrolytes (Mg/K  repleted in ER)  Cont Telemetry, trend Trops & orthostatic vs, serial EKGs  Send stool to lab for studies  Hydrate w/IVF NS       Date:    9/16      Day 2:   Pt reports 3 episodes non bloody diarrhea this am   Minimal appetite/intake  POS [employer] on physician exam,  Ongoing diarrhea and tachycardia =  Will cont IVF for rehydration  No major events on tele,   Trop neg x3  Initiate daily Mg  Replete K           ED Triage Vitals [09/15/21 2145]   Temperature Pulse Respirations Blood Pressure SpO2   99 1 °F (37 3 °C) 82 16 165/88 98 %      Temp Source Heart Rate Source Patient Position - Orthostatic VS BP Location FiO2 (%)   Tympanic Monitor Sitting -- --      Pain Score       --          Wt Readings from Last 1 Encounters:   09/15/21 99 1 kg (218 lb 7 6 oz)     Additional Vital Signs:   09/15/21 2245  --  85  18  168/80  115  98 %  --  --   09/15/21 2230  --  96  22  171/91Abnormal   124  98 %         09/16/21 1115  --  62  19  127/62  89  97 %  --  --   09/16/21 0947  --  --  --  160/79  --  --  --  --   09/16/21 0715  --  63  28Abnormal   150/66  --  97 %  --  --   09/16/21 0714  --  70  29Abnormal   150/66  --  98 %             Pertinent Labs/Diagnostic Test Results:   TRAUMA - CT head wo contrast   ED Interpretation by Bela Mcdonough DO (09/15 2320)   No acute intracranial abnormalities       Final Result by Mali Booker MD (09/15 2315)       No intracranial hemorrhage or calvarial fracture  · EKG:  Normal sinus rhythm at a rate 89 with first-degree AV block and prolonged QTC interval of 503 milliseconds     TRAUMA - CT spine cervical wo contrast   ED Interpretation by Bela Mcdonough DO (09/15 2324)   No acute fracture deformity of the spine  Thyroid nodule noted patient aware       Final Result by Mali Booker MD (09/15 2322)       No cervical spine fracture or traumatic malalignment                  Results from last 7 days   Lab Units 09/16/21  0614 09/15/21  2226   WBC Thousand/uL 9 20 7 90   HEMOGLOBIN g/dL 10 8* 11 4*   HEMATOCRIT % 31 1* 33 5*   PLATELETS Thousands/uL 153 152   NEUTROS ABS Thousands/µL 4 50 4 90         Results from last 7 days   Lab Units 09/16/21  0614 09/15/21  2226   SODIUM mmol/L 138 131*   POTASSIUM mmol/L 3 1* 2 5*   CHLORIDE mmol/L 107 97*   CO2 mmol/L 23 24   ANION GAP mmol/L 8 10   BUN mg/dL 3* 6*   CREATININE mg/dL 0 43* 0 63   EGFR ml/min/1 73sq m 117 103   CALCIUM mg/dL 7 7* 8 2*   MAGNESIUM mg/dL 1 7* 1 0*   PHOSPHORUS mg/dL 2 0*  --              Results from last 7 days   Lab Units 09/16/21  0614 09/15/21  2226   GLUCOSE RANDOM mg/dL 96 182*     Results from last 7 days   Lab Units 09/16/21  1141 09/16/21  0614 09/16/21  0321   TROPONIN I ng/mL <0 03 <0 03 <0 03         Results from last 7 days   Lab Units 09/15/21  2226   PROTIME seconds 13 4   INR  1 01   PTT seconds 26     Results from last 7 days   Lab Units 09/15/21  2226   CRP mg/L <5 0         Results from last 7 days   Lab Units 09/15/21  2345   CLARITY UA  Clear   COLOR UA  Yellow   SPEC GRAV UA  1 010   PH UA  6 0   GLUCOSE UA mg/dl Negative   KETONES UA mg/dl Trace*   BLOOD UA  Trace-Intact*   PROTEIN UA mg/dl Negative   NITRITE UA  Negative   BILIRUBIN UA  Negative   UROBILINOGEN UA E U /dl 0 2   LEUKOCYTES UA  1+*   WBC UA /hpf 10-20*   RBC UA /hpf 1-2   BACTERIA UA /hpf Moderate*   EPITHELIAL CELLS WET PREP /hpf Occasional     Results from last 7 days   Lab Units 09/15/21  2345   AMPH/METH  Negative   BARBITURATE UR  Negative   BENZODIAZEPINE UR  Negative   COCAINE UR  Negative   METHADONE URINE  Negative   OPIATE UR  Negative   PCP UR  Negative   THC UR  Negative           ED Treatment:   Medication Administration from 09/15/2021 2146 to 09/16/2021 1152       Date/Time Order Dose Route Action     09/15/2021 2225 sodium chloride 0 9 % bolus 1,000 mL 1,000 mL Intravenous New Bag     09/16/2021 0025 potassium chloride (K-DUR,KLOR-CON) CR tablet 20 mEq 20 mEq Oral Given     09/16/2021 0026 potassium chloride 20 mEq IVPB (premix) 20 mEq Intravenous New Bag     09/16/2021 0026 magnesium sulfate 2 g/50 mL IVPB (premix) 2 g 2 g Intravenous New Bag     09/16/2021 0947 lisinopril (ZESTRIL) tablet 10 mg 10 mg Oral Given     09/16/2021 0947 aspirin tablet 325 mg 325 mg Oral Given     09/16/2021 0209 sodium chloride 0 9 % with KCl 20 mEq/L infusion (premix) 125 mL/hr Intravenous New Bag     09/16/2021 0207 nicotine (NICODERM CQ) 21 mg/24 hr TD 24 hr patch 1 patch 1 patch Transdermal Medication Applied     09/16/2021 0208 heparin (porcine) subcutaneous injection 5,000 Units 5,000 Units Subcutaneous Given        Past Medical History:   Diagnosis Date    Cervical cancer ([facility] [address] )      Present on Admission:   Hypokalemia   Diarrhea   Hypomagnesemia   Hyponatremia   Tobacco abuse   Syncope      Admitting Diagnosis: Laceration of head, initial encounter [S01 91XA]  Age/Sex: 44 y o  female  Admission Orders:  Telem, serial ekgs & trops,  Cont cervical collar;  Stool to lab;  orthoatic bp x1;  HOURLY VS ,  Orthostatic bp, daily bmp      Scheduled Medications:  aspirin, 325 mg, Oral, Daily  heparin (porcine), 5,000 Units, Subcutaneous, Q12H [address]  lisinopril, 10 mg, Oral, Daily  magnesium oxide, 400 mg, Oral, BID  nicotine, 1 patch, Transdermal, Daily    9/16 @ noon - IV MG sulfate x1 ,  KDur 40 meq po x1,     Continuous IV Infusions:  sodium chloride 0 9 % with KCl 20 mEq/L, 125 mL/hr, Intravenous, Continuous      PRN Meds:  acetaminophen, 650 mg, Oral, Q6H PRN  ondansetron, 4 mg, Intravenous, Q6H PRN        Network Utilization Review Department  ATTENTION: Please call with any questions or concerns to [phone] and carefully listen to the prompts so that you are directed to the right person  All voicemails are confidential   Natalee Query all requests for admission clinical reviews, approved or denied determinations and any other requests to dedicated fax number below belonging to the campus where the patient is receiving treatment   List of dedicated fax numbers for the Facilities:  [address] DENIALS (Administrative/Medical Necessity) [phone]   [address] (Maternity/NICU/Pediatrics) [phone] [address] [place] [address] [facility] [id number] [place] [address] [address] [facility] [place] [address] [phone]

## 2021-09-16 NOTE — ED PROVIDER NOTES
Emergency Department Trauma Note  Deion Jeffers 44 y o  female MRN: [id number]  Unit/Bed#: TR 05/TR 05 Encounter: [id number]      Trauma Alert: Trauma Acuity: Trauma Evaluation  Model of Arrival: Mode of Arrival: ALS via    Trauma Team: Current Providers  Attending Provider: Nisha Wyatt DO  Attending Provider: Jaylen Perkins MD  Registered Nurse: Alicia Cates, RN  Registered Nurse: Cherry Victor RN  Consultants: None      History of Present Illness     Chief Complaint:   Chief Complaint   Patient presents with    Fall     trauma evAL  pt was found on sidewalk by coworker  unwittnessed  pt states she has had abdominal pain x 3 weeks and diarrhea for about 3 weeks     HPI:  Deion Jeffers is a 44 y o  female who presents with unwitnessed fall, scalp abrasions and confusion at the scene  Disoriented to what happened, oriented person and place     Mechanism:Details of Incident: pt was found on side walk at work  unwittnessed  small head lacl  pt remembers just waiting for her friend to get off of work and feeling tired and then does not remember anything after that, Injury Date: 09/15/21        HPI  Review of Systems   Constitutional: Negative for chills and fever  HENT: Negative for rhinorrhea and sore throat  Eyes: Negative for visual disturbance  Respiratory: Negative for cough and shortness of breath  Cardiovascular: Negative for chest pain and leg swelling  Gastrointestinal: Positive for abdominal pain and diarrhea  Negative for nausea and vomiting  Genitourinary: Negative for dysuria  Musculoskeletal: Negative for back pain and myalgias  Skin: Positive for wound  Negative for rash  A minor scalp abrasion   Neurological: Negative for dizziness and headaches  Psychiatric/Behavioral: Positive for confusion  All other systems reviewed and are negative  Historical Information     Immunizations: There is no immunization history on file for this patient      Past Medical History:   Diagnosis Date    Cervical cancer [facility])      History reviewed  No pertinent family history  Past Surgical History:   Procedure Laterality Date    SHOULDER SURGERY Left      Social History     Tobacco Use    Smoking status: Current Every Day Smoker     Packs/day: 1 00    Smokeless tobacco: Never Used   Substance Use Topics    Alcohol use: Yes     Comment: socially    Drug use: Never     E-Cigarette/Vaping     E-Cigarette/Vaping Substances       Family History: non-contributory    Meds/Allergies   Prior to Admission Medications   Prescriptions Last Dose Informant Patient Reported? Taking?   aspirin 325 mg tablet   Yes No   Sig: Take 325 mg by mouth daily   lisinopril (ZESTRIL) 5 mg tablet   No No   Sig: Take 2 tablets (10 mg total) by mouth daily      Facility-Administered Medications: None       No Known Allergies    PHYSICAL EXAM    PE limited by:  Nothing    Objective   Vitals:   First set: Temperature: 99 1 °F (37 3 °C) (09/15/21 2145)  Pulse: 82 (09/15/21 2145)  Respirations: 16 (09/15/21 2145)  Blood Pressure: 165/88 (09/15/21 2145)  SpO2: 98 % (09/15/21 2145)    Primary Survey:   (A) Airway:  Patent and open  (B) Breathing:  Nonlabored  (C) Circulation: Pulses:   normal  (D) Disabliity:  GCS Total:  15  (E) Expose:  Completed    Secondary Survey: (Click on Physical Exam tab above)  Physical Exam  Vitals and nursing note reviewed  Constitutional:       Appearance: Normal appearance  She is well-developed  HENT:      Nose: Nose normal       Mouth/Throat:      Pharynx: No oropharyngeal exudate  Eyes:      General: No scleral icterus  Conjunctiva/sclera: Conjunctivae normal       Pupils: Pupils are equal, round, and reactive to light  Neck:      Vascular: No JVD  Trachea: No tracheal deviation  Cardiovascular:      Rate and Rhythm: Normal rate and regular rhythm  Heart sounds: Normal heart sounds  No murmur heard       Pulmonary:      Effort: Pulmonary effort is normal  No respiratory distress  Breath sounds: Normal breath sounds  No wheezing or rales  Abdominal:      General: Bowel sounds are normal       Palpations: Abdomen is soft  Tenderness: There is no abdominal tenderness  There is no guarding  Musculoskeletal:         General: No tenderness  Normal range of motion  Cervical back: Normal range of motion and neck supple  Skin:     General: Skin is warm and dry  Coloration: Skin is pale  Comments: Under scalp abrasion   Neurological:      General: No focal deficit present  Mental Status: She is alert  She is disoriented  Cranial Nerves: No cranial nerve deficit  Sensory: No sensory deficit  Motor: No abnormal muscle tone  Comments: 5/5 motor, nl sens  Oriented person place and time  Amnestic to episode   Psychiatric:         Behavior: Behavior normal          Cervical spine cleared by clinical criteria? No (imaging required)      Invasive Devices     Peripheral Intravenous Line            Peripheral IV 09/15/21 Left Antecubital 1 day                Lab Results:   Results Reviewed     Procedure Component Value Units Date/Time    Urine Microscopic [[id number]]  (Abnormal) Collected: 09/15/21 2345    Lab Status: Final result Specimen: Urine, Clean Catch Updated: 09/16/21 0040     RBC, UA 1-2 /hpf      WBC, UA 10-20 /hpf      Epithelial Cells Occasional /hpf      Bacteria, UA Moderate /hpf      Hyaline Casts, UA 1-2 /lpf     Urine culture [[id number]] Collected: 09/15/21 2345    Lab Status:  In process Specimen: Urine, Clean Catch Updated: 09/16/21 0040    UA w Reflex to Microscopic w Reflex to Culture [[id number]]  (Abnormal) Collected: 09/15/21 2345    Lab Status: Final result Specimen: Urine, Clean Catch Updated: 09/16/21 0014     Color, UA Yellow     Clarity, UA Clear     Specific Gravity, UA 1 010     pH, UA 6 0     Leukocytes, UA 1+     Nitrite, UA Negative     Protein, UA Negative mg/dl      Glucose, UA Negative mg/dl      Ketones, UA Trace mg/dl      Urobilinogen, UA 0 2 E U /dl      Bilirubin, UA Negative     Blood, UA Trace-Intact    Rapid drug screen, urine [[id number]]  (Normal) Collected: 09/15/21 2345    Lab Status: Final result Specimen: Urine, Clean Catch Updated: 09/16/21 0003     Amph/Meth UR Negative     Barbiturate Ur Negative     Benzodiazepine Urine Negative     Cocaine Urine Negative     Methadone Urine Negative     Opiate Urine Negative     PCP Ur Negative     THC Urine Negative     Oxycodone Urine Negative    Narrative:      FOR MEDICAL PURPOSES ONLY  IF CONFIRMATION NEEDED PLEASE CONTACT THE LAB WITHIN 5 DAYS      Drug Screen Cutoff Levels:  AMPHETAMINE/METHAMPHETAMINES  1000 ng/mL  BARBITURATES     200 ng/mL  BENZODIAZEPINES     200 ng/mL  COCAINE      300 ng/mL  METHADONE      300 ng/mL  OPIATES      300 ng/mL  PHENCYCLIDINE     25 ng/mL  THC       50 ng/mL  OXYCODONE      100 ng/mL    Clostridium difficile toxin by PCR with EIA [[id number]]     Lab Status: No result Specimen: Stool from Per Rectum     Magnesium [[id number]]  (Abnormal) Collected: 09/15/21 2226    Lab Status: Final result Specimen: Blood Updated: 09/15/21 2350     Magnesium 1 0 mg/dL     Basic metabolic panel [[id number]]  (Abnormal) Collected: 09/15/21 2226    Lab Status: Final result Specimen: Blood from Arm, Left Updated: 09/15/21 2306     Sodium 131 mmol/L      Potassium 2 5 mmol/L      Chloride 97 mmol/L      CO2 24 mmol/L      ANION GAP 10 mmol/L      BUN 6 mg/dL      Creatinine 0 63 mg/dL      Glucose 182 mg/dL      Calcium 8 2 mg/dL      eGFR 103 ml/min/1 73sq m     Narrative:      [place] guidelines for Chronic Kidney Disease (CKD):     Stage 1 with normal or high GFR (GFR > 90 mL/min/1 73 square meters)    Stage 2 Mild CKD (GFR = 60-89 mL/min/1 73 square meters)    Stage 3A Moderate CKD (GFR = 45-59 mL/min/1 73 square meters)    Stage 3B Moderate CKD (GFR = 30-44 mL/min/1 73 square meters)    Stage 4 Severe CKD (GFR = 15-29 mL/min/1 73 square meters)    Stage 5 End Stage CKD (GFR <15 mL/min/1 73 square meters)  Note: GFR calculation is accurate only with a steady state creatinine    APTT [[id number]]  (Normal) Collected: 09/15/21 2226    Lab Status: Final result Specimen: Blood from Arm, Left Updated: 09/15/21 2256     PTT 26 seconds     Protime-INR [[id number]]  (Normal) Collected: 09/15/21 2226    Lab Status: Final result Specimen: Blood from Arm, Left Updated: 09/15/21 2256     Protime 13 4 seconds      INR 1 01    CBC and differential [[id number]]  (Abnormal) Collected: 09/15/21 2226    Lab Status: Final result Specimen: Blood from Arm, Left Updated: 09/15/21 2247     WBC 7 90 Thousand/uL      RBC 3 53 Million/uL      Hemoglobin 11 4 g/dL      Hematocrit 33 5 %      MCV 95 fL      MCH 32 4 pg      MCHC 34 2 g/dL      RDW 13 4 %      MPV 8 2 fL      Platelets 865 Thousands/uL      Neutrophils Relative 62 %      Lymphocytes Relative 27 %      Monocytes Relative 9 %      Eosinophils Relative 1 %      Basophils Relative 1 %      Neutrophils Absolute 4 90 Thousands/µL      Lymphocytes Absolute 2 10 Thousands/µL      Monocytes Absolute 0 70 Thousand/µL      Eosinophils Absolute 0 10 Thousand/µL      Basophils Absolute 0 00 Thousands/µL                  Imaging Studies:   Direct to CT: Yes  TRAUMA - CT spine cervical wo contrast   ED Interpretation by Jenni Hernandez DO (09/15 2324)   No acute fracture deformity of the spine  Thyroid nodule noted patient aware      Final Result by Shanna Estrada MD (09/15 2322)      No cervical spine fracture or traumatic malalignment  Incidental thyroid nodule(s) for which nonemergent thyroid ultrasound is recommended               Workstation performed: [id number]         TRAUMA - CT head wo contrast   ED Interpretation by Jenni Hernandez DO (09/15 2320)   No acute intracranial abnormalities      Final Result by Shanna Estrada MD (09/15 2313)      No intracranial hemorrhage or calvarial fracture  Workstation performed: [id number]               Procedures  ECG 12 Lead Documentation Only    Date/Time: 9/15/2021 10:05 PM  Performed by: Chica Mena DO  Authorized by: Chica Mena DO     Indications / Diagnosis:  89  ECG reviewed by me, the ED Provider: yes    Patient location:  ED  Previous ECG:     Previous ECG:  Unavailable    Comparison to cardiac monitor: Yes    Interpretation:     Interpretation: abnormal    Rate:     ECG rate:  89    ECG rate assessment: normal    Rhythm:     Rhythm: sinus rhythm and A-V block    Ectopy:     Ectopy: none    QRS:     QRS axis:  Normal    QRS intervals:  Normal  Conduction:     Conduction: abnormal      Abnormal conduction: 1st degree    ST segments:     ST segments:  Normal  T waves:     T waves: normal    Other findings:     Other findings: prolonged qTc interval    Comments:      EKG reveals a QTC of 503 millisecond             ED Course  ED Course as of Sep 16 0107   Thu Sep 16, 2021   0007 Discussed case with hospitalist   Observation for electrolyte replacement              MDM        Disposition  [facility] Transfer: No  Final diagnoses:   Hypokalemia   Hypomagnesemia   Head injury   Concussion     Time reflects when diagnosis was documented in both MDM as applicable and the Disposition within this note     Time User Action Codes Description Comment    9/16/2021 12:09 AM Jeraline Baseman Add [E87 6] Hypokalemia     9/16/2021 12:09 AM Jeraline Baseman Add [E83 42] Hypomagnesemia     9/16/2021 12:09 AM Jeraline Baseman Add [S09 90XA] Head injury     9/16/2021 12:09 AM Jeraline Baseman Add [S06 0X9A] Concussion       ED Disposition     ED Disposition Condition Date/Time Comment    Admit Stable Thu Sep 16, 2021 12:09 AM Case was discussed with Darrell Chery and the patient's admission status was agreed to be Admission Status: observation status to the service of Dr Dmitri Beck           Follow-up Information None       Patient's Medications   Discharge Prescriptions    No medications on file     No discharge procedures on file      PDMP Review     None          ED Provider  Electronically Signed by         Bela Mcdonough DO  09/16/21 0107

## 2021-09-16 NOTE — ASSESSMENT & PLAN NOTE
· Will send stool for C diff as well as enteric panel  · Obtain CRP  · Patient will likely need evaluation by GI as an outpatient as well as colonoscopy    9/16/21: patient with 4 weeks of diarrhea, will need stool studies, reports 3 episodes this morning, no blood noted

## 2021-09-16 NOTE — ASSESSMENT & PLAN NOTE
· Will send stool for C diff as well as enteric panel  · Obtain CRP  · Patient will likely need evaluation by GI as an outpatient as well as colonoscopy

## 2021-09-16 NOTE — PROGRESS NOTES
[facility]  Progress Note - [address] 1970, 44 y o  female MRN: [id number]  Unit/Bed#: TR 05 Encounter: [id number]  Primary Care Provider: HELGA Ramos   Date and time admitted to hospital: 9/15/2021  9:47 PM    Syncope  Assessment & Plan  · Will place on telemetry  · Trend cardiac enzymes  · Obtain serial EKGs  · Obtain orthostatic vital signs Q shift    9/8/21: positive orthostatics on my exam, will continue to replenish fluid with iv hydration, also requiring IV repletion, will need further inpatient stay  -no major events on telemetry  -trop neg x3  -orthostatic positive-continue fluid hydration    Tobacco abuse  Assessment & Plan  Will give nicotine 21 mg transdermal daily    Hyponatremia  Assessment & Plan  Will hydrate with normal saline 125 cc/hour continue to monitor with repeat labs in a m     9/16/21: with ongoing diarrhea and tachycardia, will continue fluid hydration for dehydration    Hypomagnesemia  Assessment & Plan  Patient is status post IV repletion in the ER, will continue to monitor with repeat labs in a m     9/8/21: will start on daily magnesium    Diarrhea  Assessment & Plan  · Will send stool for C diff as well as enteric panel  · Obtain CRP  · Patient will likely need evaluation by GI as an outpatient as well as colonoscopy    9/16/21: patient with 4 weeks of diarrhea, will need stool studies, reports 3 episodes this morning, no blood noted  * Hypokalemia  Assessment & Plan  · Placed in observation telemetry  · Likely secondary to GI losses  · Patient is status post p o  As well as IV repletion in the ER  · Will place on telemetry  · Will hydrate with normal saline with 20 mEq of KCl at 125 cc/hour    9/16/21: patient continues to have hypokalemia, will need further IV and PO correction  -likely secondary to ongoing diarrhea              VTE Pharmacologic Prophylaxis: VTE Score: 4 Moderate Risk (Score 3-4) - Pharmacological DVT Prophylaxis Ordered: heparin  Patient Centered Rounds: I performed bedside rounds with nursing staff today  Discussions with Specialists or Other Care Team Provider: n/a    Education and Discussions with Family / Patient: Updated  (daughter) at bedside  Time Spent for Care: 20 minutes  More than 50% of total time spent on counseling and coordination of care as described above  Current Length of Stay: 0 day(s)  Current Patient Status: Observation   Certification Statement: The patient will continue to require additional inpatient hospital stay due to iv fluid hydration and electrolyte replenishment  Discharge Plan: Anticipate discharge in 24-48 hrs to home  Code Status: Level 1 - Full Code    Subjective:   Patient with 3 episodes of diarrhea his morning, denies abdominal pain or any other complaints  Objective:     Vitals:   Temp (24hrs), Av 1 °F (37 3 °C), Min:99 1 °F (37 3 °C), Max:99 1 °F (37 3 °C)    Temp:  [99 1 °F (37 3 °C)] 99 1 °F (37 3 °C)  HR:  [59-96] 71  Resp:  [9-36] 20  BP: (117-176)/(59-97) 135/70  SpO2:  [92 %-99 %] 98 %  Body mass index is 41 28 kg/m²  Input and Output Summary (last 24 hours):   No intake or output data in the 24 hours ending 21 1425    Physical Exam:   Physical Exam  Vitals and nursing note reviewed  Constitutional:       General: She is not in acute distress  Appearance: Normal appearance  She is not ill-appearing, toxic-appearing or diaphoretic  HENT:      Head: Normocephalic and atraumatic  Eyes:      General: No scleral icterus  Cardiovascular:      Rate and Rhythm: Tachycardia present  Heart sounds: No murmur heard  No friction rub  No gallop  Pulmonary:      Effort: No respiratory distress  Breath sounds: No stridor  No wheezing, rhonchi or rales  Chest:      Chest wall: No tenderness  Abdominal:      General: Abdomen is flat  There is no distension  Palpations: Abdomen is soft  There is no mass  Tenderness: There is no abdominal tenderness  There is no right CVA tenderness, left CVA tenderness, guarding or rebound  Hernia: No hernia is present  Musculoskeletal:         General: No swelling, tenderness, deformity or signs of injury  Right lower leg: No edema  Left lower leg: No edema  Skin:     Coloration: Skin is not jaundiced or pale  Findings: No bruising, erythema, lesion or rash  Neurological:      Mental Status: She is alert and oriented to person, place, and time            Additional Data:     Labs:  Results from last 7 days   Lab Units 09/16/21  0614   WBC Thousand/uL 9 20   HEMOGLOBIN g/dL 10 8*   HEMATOCRIT % 31 1*   PLATELETS Thousands/uL 153   NEUTROS PCT % 49   LYMPHS PCT % 40   MONOS PCT % 7   EOS PCT % 3     Results from last 7 days   Lab Units 09/16/21  0614   SODIUM mmol/L 138   POTASSIUM mmol/L 3 1*   CHLORIDE mmol/L 107   CO2 mmol/L 23   BUN mg/dL 3*   CREATININE mg/dL 0 43*   ANION GAP mmol/L 8   CALCIUM mg/dL 7 7*   GLUCOSE RANDOM mg/dL 96     Results from last 7 days   Lab Units 09/15/21  2226   INR  1 01                   Lines/Drains:  Invasive Devices     Peripheral Intravenous Line            Peripheral IV 09/15/21 Left Antecubital 1 day                  Telemetry:    Telemetry Reviewed: Sinus Tachycardia  Indication for Continued Telemetry Use: Metabolic/electrolyte disturbance with high probability of dysrhythmia           Imaging: Reviewed radiology reports from this admission including: CT head    Recent Cultures (last 7 days):         Last 24 Hours Medication List:   Current Facility-Administered Medications   Medication Dose Route Frequency Provider Last Rate    acetaminophen  650 mg Oral Q6H PRN Leena Lee PA-C      aspirin  325 mg Oral Daily ODALIS Chery-MAC      heparin (porcine)  5,000 Units Subcutaneous Q12H [address] PA-C      lisinopril  10 mg Oral Daily ODALIS Chery-MAC      magnesium oxide  400 mg Oral BID Earl Rahman       nicotine  1 patch Transdermal Daily Linette Graham PA-C      ondansetron  4 mg Intravenous Q6H PRN Linette Graham PA-C      sodium chloride 0 9 % with KCl 20 mEq/L  125 mL/hr Intravenous Continuous Linette Graham PA-C Stopped (09/16/21 1203)        Today, Patient Was Seen By: Dave Landa DO    **Please Note: This note may have been constructed using a voice recognition system  **

## 2021-09-16 NOTE — ASSESSMENT & PLAN NOTE
· Placed in observation telemetry  · Likely secondary to GI losses  · Patient is status post p o  As well as IV repletion in the ER  · Will place on telemetry  · Will hydrate with normal saline with 20 mEq of KCl at 125 cc/hour    9/16/21: patient continues to have hypokalemia, will need further IV and PO correction  -likely secondary to ongoing diarrhea

## 2021-09-16 NOTE — H&P
[facility]  H&P- [address] 1970, 44 y o  female MRN: [id number]  Unit/Bed#: TR 05 Encounter: [id number]  Primary Care Provider: HELGA Muro   Date and time admitted to hospital: 9/15/2021  9:47 PM    Hypomagnesemia  Assessment & Plan  Patient is status post IV repletion in the ER, will continue to monitor with repeat labs in a m  Syncope  Assessment & Plan  · Will place on telemetry  · Trend cardiac enzymes  · Obtain serial EKGs  · Obtain orthostatic vital signs Q shift    Tobacco abuse  Assessment & Plan  Will give nicotine 21 mg transdermal daily    Hyponatremia  Assessment & Plan  Will hydrate with normal saline 125 cc/hour continue to monitor with repeat labs in a m  Diarrhea  Assessment & Plan  · Will send stool for C diff as well as enteric panel  · Obtain CRP  · Patient will likely need evaluation by GI as an outpatient as well as colonoscopy      * Hypokalemia  Assessment & Plan  · Placed in observation telemetry  · Likely secondary to GI losses  · Patient is status post p o  As well as IV repletion in the ER  · Will place on telemetry  · Will hydrate with normal saline with 20 mEq of KCl at 125 cc/hour        VTE Prophylaxis: Heparin  Code Status:  Level 1  POLST: There is no POLST form on file for this patient (pre-hospital)  Discussion with family:  None present at bedside at time of exam    Anticipated Length of Stay:  Patient will be admitted on an Observation basis with an anticipated length of stay of  < 2 midnights  Justification for Hospital Stay:  Syncope requiring further cardiac evaluation, multiple electrolyte disturbances requiring IV repletion    Chief Complaint:   Syncope x1 this evening    History of Present Illness:    [address] [facility] Geraldo is a 44 y o  female who presents with syncope x1 this evening  Patient presents ER for further evaluation treatment of her syncopal episode x1 this evening    Patient states that she was sitting on the curb waiting for a ride smoking of cigarettes that was the last thing she recalls before her friend was standing over her and there was an ambulance there  Patient states that she has had ongoing diarrhea for the past 2 months which recently became worse approximately 1 month ago when she states that she has been having up to 5 bowel movements a day there accompanied with mucus but no bright red blood per rectum  Patient does not currently follow with GI has never had a colonoscopy  Additionally patient is reporting some decreased appetite and episodic diffuse crampy abdominal pain  Patient denies any lightheadedness, dizziness, chest pain, palpitations or shortness of breath prior to her syncopal episode and she is unsure how long she was unconscious  Patient denies any further trauma  Review of Systems:  Review of Systems   Constitutional: Negative for chills and fever  HENT: Negative for congestion and sore throat  Respiratory: Negative for cough, shortness of breath and wheezing  Cardiovascular: Negative for chest pain and palpitations  Gastrointestinal: Positive for abdominal pain and diarrhea  Negative for nausea and vomiting  Genitourinary: Negative for dysuria, frequency, hematuria and urgency  Musculoskeletal: Negative for arthralgias and myalgias  Skin: Negative for wound  Neurological: Positive for syncope  Negative for dizziness, light-headedness and headaches  All other systems reviewed and are negative  Past Medical and Surgical History:   Past Medical History:   Diagnosis Date    Cervical cancer [facility])        Past Surgical History:   Procedure Laterality Date    SHOULDER SURGERY Left        Meds/Allergies:  Prior to Admission medications    Medication Sig Start Date End Date Taking?  Authorizing Provider   aspirin 325 mg tablet Take 325 mg by mouth daily    Historical Provider, MD   lisinopril (ZESTRIL) 5 mg tablet Take 2 tablets (10 mg total) by mouth daily 2/26/20   HELGA Salmeron     I have reviewed home medications with patient personally  Allergies: No Known Allergies    Social History:  Marital Status: Single   Occupation:  [employer]  Patient Pre-hospital Living Situation:  Resides at home  Patient Pre-hospital Level of Mobility:  Full without assist  Patient Pre-hospital Diet Restrictions:  None  Substance Use History:   Social History     Substance and Sexual Activity   Alcohol Use Yes    Comment: socially     Social History     Tobacco Use   Smoking Status Current Every Day Smoker    Packs/day: 1 00   Smokeless Tobacco Never Used     Social History     Substance and Sexual Activity   Drug Use Never       Family History:  I have reviewed the patients family history    Physical Exam:   Vitals:   Blood Pressure: 167/90 (09/16/21 0115)  Pulse: 87 (09/16/21 0115)  Temperature: 99 1 °F (37 3 °C) (09/15/21 2145)  Temp Source: Tympanic (09/15/21 2145)  Respirations: 18 (09/16/21 0115)  Weight - Scale: 99 1 kg (218 lb 7 6 oz) (09/15/21 2145)  SpO2: 97 % (09/16/21 0115)    Physical Exam  Vitals and nursing note reviewed  Constitutional:       General: She is not in acute distress  Appearance: Normal appearance  HENT:      Head: Normocephalic and atraumatic  Right Ear: Tympanic membrane normal       Left Ear: Tympanic membrane normal       Nose: Nose normal       Mouth/Throat:      Mouth: Mucous membranes are moist       Pharynx: Oropharynx is clear  No posterior oropharyngeal erythema  Eyes:      Extraocular Movements: Extraocular movements intact  Conjunctiva/sclera: Conjunctivae normal       Pupils: Pupils are equal, round, and reactive to light  Cardiovascular:      Rate and Rhythm: Normal rate and regular rhythm  Pulses: Normal pulses  Heart sounds: Normal heart sounds  No murmur heard  Pulmonary:      Effort: Pulmonary effort is normal  No respiratory distress  Breath sounds: Normal breath sounds   No rhonchi or rales    Abdominal:      General: Bowel sounds are normal       Palpations: Abdomen is soft  Tenderness: There is no abdominal tenderness  Musculoskeletal:      Cervical back: Normal range of motion and neck supple  No muscular tenderness  Right lower leg: No edema  Left lower leg: No edema  Skin:     General: Skin is warm and dry  Capillary Refill: Capillary refill takes less than 2 seconds  Comments: Scalp abrasion present upon admission   Neurological:      General: No focal deficit present  Mental Status: She is alert and oriented to person, place, and time  Additional Data:   Lab Results: I have personally reviewed pertinent reports  Results from last 7 days   Lab Units 09/15/21  2226   WBC Thousand/uL 7 90   HEMOGLOBIN g/dL 11 4*   HEMATOCRIT % 33 5*   PLATELETS Thousands/uL 152   NEUTROS PCT % 62   LYMPHS PCT % 27   MONOS PCT % 9   EOS PCT % 1     Results from last 7 days   Lab Units 09/15/21  2226   SODIUM mmol/L 131*   POTASSIUM mmol/L 2 5*   CHLORIDE mmol/L 97*   CO2 mmol/L 24   BUN mg/dL 6*   CREATININE mg/dL 0 63   CALCIUM mg/dL 8 2*     Results from last 7 days   Lab Units 09/15/21  2226   INR  1 01               Imaging: I have personally reviewed pertinent reports  TRAUMA - CT spine cervical wo contrast   ED Interpretation by Velia Pugh DO (09/15 2324)   No acute fracture deformity of the spine  Thyroid nodule noted patient aware      Final Result by Cailin Palumbo MD (09/15 2322)      No cervical spine fracture or traumatic malalignment  Incidental thyroid nodule(s) for which nonemergent thyroid ultrasound is recommended  Workstation performed: [id number]         TRAUMA - CT head wo contrast   ED Interpretation by Velia Pugh DO (09/15 2320)   No acute intracranial abnormalities      Final Result by Cailin Palumbo MD (09/15 2311)      No intracranial hemorrhage or calvarial fracture                    Workstation performed: [id number]             EKG, Pathology, and Other Studies Reviewed on Admission:   · EKG:  Normal sinus rhythm at a rate 89 with first-degree AV block and prolonged QTC interval of 503 milliseconds    Epic Records Reviewed: Yes     ** Please Note: This note has been constructed using a voice recognition system   **

## 2021-09-16 NOTE — ASSESSMENT & PLAN NOTE
Will hydrate with normal saline 125 cc/hour continue to monitor with repeat labs in a m     9/16/21: with ongoing diarrhea and tachycardia, will continue fluid hydration for dehydration

## 2021-09-17 ENCOUNTER — APPOINTMENT (INPATIENT)
Dept: CT IMAGING | Facility: HOSPITAL | Age: 51
DRG: 204 | End: 2021-09-17
Payer: COMMERCIAL

## 2021-09-17 VITALS
TEMPERATURE: 97.8 F | HEART RATE: 77 BPM | SYSTOLIC BLOOD PRESSURE: 162 MMHG | BODY MASS INDEX: 19.83 KG/M2 | HEIGHT: 61 IN | DIASTOLIC BLOOD PRESSURE: 77 MMHG | OXYGEN SATURATION: 99 % | RESPIRATION RATE: 18 BRPM | WEIGHT: 105 LBS

## 2021-09-17 LAB
ANION GAP SERPL CALCULATED.3IONS-SCNC: 7 MMOL/L (ref 4–13)
ATRIAL RATE: 66 BPM
ATRIAL RATE: 70 BPM
ATRIAL RATE: 70 BPM
ATRIAL RATE: 89 BPM
BUN SERPL-MCNC: 2 MG/DL (ref 7–25)
C DIFF TOX B TCDB STL QL NAA+PROBE: NEGATIVE
C DIFF TOX GENS STL QL NAA+PROBE: NEGATIVE
CALCIUM SERPL-MCNC: 8.4 MG/DL (ref 8.6–10.5)
CHLORIDE SERPL-SCNC: 104 MMOL/L (ref 98–107)
CO2 SERPL-SCNC: 25 MMOL/L (ref 21–31)
CREAT SERPL-MCNC: 0.6 MG/DL (ref 0.6–1.2)
ERYTHROCYTE [DISTWIDTH] IN BLOOD BY AUTOMATED COUNT: 14.1 % (ref 11.5–14.5)
GFR SERPL CREATININE-BSD FRML MDRD: 105 ML/MIN/1.73SQ M
GLUCOSE SERPL-MCNC: 102 MG/DL (ref 65–99)
GLUCOSE SERPL-MCNC: 113 MG/DL (ref 65–140)
HCT VFR BLD AUTO: 32.1 % (ref 42–47)
HGB BLD-MCNC: 11 G/DL (ref 12–16)
MCH RBC QN AUTO: 33 PG (ref 26–34)
MCHC RBC AUTO-ENTMCNC: 34.2 G/DL (ref 31–37)
MCV RBC AUTO: 97 FL (ref 81–99)
P AXIS: 65 DEGREES
P AXIS: 75 DEGREES
P AXIS: 77 DEGREES
P AXIS: 85 DEGREES
PLATELET # BLD AUTO: 150 THOUSANDS/UL (ref 149–390)
PMV BLD AUTO: 8.9 FL (ref 8.6–11.7)
POTASSIUM SERPL-SCNC: 4.1 MMOL/L (ref 3.5–5.5)
PR INTERVAL: 182 MS
PR INTERVAL: 202 MS
PR INTERVAL: 212 MS
PR INTERVAL: 236 MS
QRS AXIS: 48 DEGREES
QRS AXIS: 53 DEGREES
QRS AXIS: 55 DEGREES
QRS AXIS: 58 DEGREES
QRSD INTERVAL: 88 MS
QRSD INTERVAL: 90 MS
QRSD INTERVAL: 96 MS
QRSD INTERVAL: 98 MS
QT INTERVAL: 414 MS
QT INTERVAL: 442 MS
QT INTERVAL: 448 MS
QT INTERVAL: 462 MS
QTC INTERVAL: 477 MS
QTC INTERVAL: 483 MS
QTC INTERVAL: 484 MS
QTC INTERVAL: 503 MS
RBC # BLD AUTO: 3.32 MILLION/UL (ref 3.9–5.2)
SODIUM SERPL-SCNC: 136 MMOL/L (ref 134–143)
T WAVE AXIS: 28 DEGREES
T WAVE AXIS: 30 DEGREES
T WAVE AXIS: 36 DEGREES
T WAVE AXIS: 58 DEGREES
VENTRICULAR RATE: 66 BPM
VENTRICULAR RATE: 70 BPM
VENTRICULAR RATE: 70 BPM
VENTRICULAR RATE: 89 BPM
WBC # BLD AUTO: 7.5 THOUSAND/UL (ref 4.8–10.8)

## 2021-09-17 PROCEDURE — 93010 ELECTROCARDIOGRAM REPORT: CPT | Performed by: INTERNAL MEDICINE

## 2021-09-17 PROCEDURE — 80048 BASIC METABOLIC PNL TOTAL CA: CPT | Performed by: NURSE PRACTITIONER

## 2021-09-17 PROCEDURE — 99239 HOSP IP/OBS DSCHRG MGMT >30: CPT | Performed by: NURSE PRACTITIONER

## 2021-09-17 PROCEDURE — 74177 CT ABD & PELVIS W/CONTRAST: CPT

## 2021-09-17 PROCEDURE — G1004 CDSM NDSC: HCPCS

## 2021-09-17 PROCEDURE — 82948 REAGENT STRIP/BLOOD GLUCOSE: CPT

## 2021-09-17 PROCEDURE — 85027 COMPLETE CBC AUTOMATED: CPT | Performed by: NURSE PRACTITIONER

## 2021-09-17 RX ORDER — SODIUM CHLORIDE AND POTASSIUM CHLORIDE .9; .15 G/100ML; G/100ML
100 SOLUTION INTRAVENOUS CONTINUOUS
Status: DISCONTINUED | OUTPATIENT
Start: 2021-09-17 | End: 2021-09-17

## 2021-09-17 RX ADMIN — HEPARIN SODIUM 5000 UNITS: 5000 INJECTION INTRAVENOUS; SUBCUTANEOUS at 08:56

## 2021-09-17 RX ADMIN — NICOTINE 1 PATCH: 21 PATCH, EXTENDED RELEASE TRANSDERMAL at 08:56

## 2021-09-17 RX ADMIN — MAGNESIUM OXIDE TAB 400 MG (241.3 MG ELEMENTAL MG) 400 MG: 400 (241.3 MG) TAB at 08:56

## 2021-09-17 RX ADMIN — IOHEXOL 75 ML: 350 INJECTION, SOLUTION INTRAVENOUS at 10:37

## 2021-09-17 RX ADMIN — LISINOPRIL 10 MG: 10 TABLET ORAL at 08:56

## 2021-09-17 RX ADMIN — SODIUM CHLORIDE AND POTASSIUM CHLORIDE 125 ML/HR: .9; .15 SOLUTION INTRAVENOUS at 08:01

## 2021-09-17 RX ADMIN — ASPIRIN 325 MG: 325 TABLET ORAL at 08:56

## 2021-09-17 NOTE — DISCHARGE INSTR - AVS FIRST PAGE
Dear Yinka Chou,     It was our pleasure to care for you here at [facility], [facility]  It is our hope that we were always able to exceed the expected standards for your care during your stay  You were hospitalized due to syncope and electrolyte abnormality  You were cared for on the for floor by HELGA Jimenez with the [facility] who covers for your primary care physician (PCP), FRANCESCO Duncan, while you were hospitalized  If you have any questions or concerns related to this hospitalization, you may contact us at [phone]  For follow up as well as any medication refills, we recommend that you follow up with your primary care physician  A registered nurse will reach out to you by phone within a few days after your discharge to answer any additional questions that you may have after going home  However, at this time we provide for you here, the most important instructions / recommendations at discharge:       · Notable Medication Adjustments -   · Magnesium 400 mg twice daily x 5 days  · Testing Required after Discharge -   · Follow-up with gastroenterology for possible EGD and colonoscopy  · Important follow up information -   · Follow-up PCP  · Follow-up with GI  · Other Instructions -   · Please refer to discharge instruction  · Please review this entire after visit summary as additional general instructions including medication list, appointments, activity, diet, any pertinent wound care, and other additional recommendations from your care team that may be provided for you        Sincerely,     HELGA Jimenez

## 2021-09-17 NOTE — NURSING NOTE
Pt discharged home  Vss  Pt denies pain, denies shortness  Discharge instructions read and explained to pt all questions answered  IV removed without complications and tip intact  All belongings with pt  Pt escorted to front entrance and assisted into car of father

## 2021-09-17 NOTE — ASSESSMENT & PLAN NOTE
· Hypovolemia hyponatremia  · Has been having poor diet and stressful job with diarrhea worsening over the past month

## 2021-09-17 NOTE — PLAN OF CARE
Problem: Nutrition/Hydration-ADULT  Goal: Nutrient/Hydration intake appropriate for improving, restoring or maintaining nutritional needs  Description: Monitor and assess patient's nutrition/hydration status for malnutrition  Collaborate with interdisciplinary team and initiate plan and interventions as ordered  Monitor patient's weight and dietary intake as ordered or per policy  Utilize nutrition screening tool and intervene as necessary  Determine patient's food preferences and provide high-protein, high-caloric foods as appropriate       INTERVENTIONS:  - Monitor oral intake, urinary output, labs, and treatment plans  - Assess nutrition and hydration status and recommend course of action  - Evaluate amount of meals eaten  - Assist patient with eating if necessary   - Allow adequate time for meals  - Recommend/ encourage appropriate diets, oral nutritional supplements, and vitamin/mineral supplements  - Order, calculate, and assess calorie counts as needed  - Recommend, monitor, and adjust tube feedings and TPN/PPN based on assessed needs  - Assess need for intravenous fluids  - Provide specific nutrition/hydration education as appropriate  - Include patient/family/caregiver in decisions related to nutrition  9/17/2021 0946 by Watson Maciel RN  Outcome: Progressing  9/17/2021 0943 by Watson Maciel RN  Outcome: Progressing

## 2021-09-17 NOTE — ASSESSMENT & PLAN NOTE
· Likely related to volume depletion and electrolyte abnormality  · Feeling much improved with IV fluid  · No significant arrhythmia noted on the EKG/telemetry

## 2021-09-17 NOTE — ASSESSMENT & PLAN NOTE
· C diff- negative   · Stool cultures is pending at this time  · Nonbloody  · GI outpatient referral  · Diet discussed at length  · Suspected that this is due to stressful job, poor diet however will need to rule out any possible malignancy as she has no prior colonoscopy or endoscopy

## 2021-09-17 NOTE — INCIDENTAL FINDINGS
The following findings require follow up:  Radiographic finding   Finding:   CT a/p   1   No acute abnormality in the abdomen or pelvis  2   A 4 mm right lower lobe solid pulmonary nodule   If the patient is low risk, no follow-up is recommended   If the patient is high risk, follow-up chest CT in 12 months is recommended, per Fleischner Society guidelines  3   Hepatic steatosis  4   Cross fused renal ectopia  CT cervical spine   No cervical spine fracture or traumatic malalignment  Incidental thyroid nodule(s) for which nonemergent thyroid ultrasound is recommended  Follow up required: CT chest without contrast in 12- month; US thyroid in 1-2 weeks        Please notify the following clinician to assist with the follow up:   HELGA Cheatham- PCP

## 2021-09-17 NOTE — PLAN OF CARE
Problem: Potential for Falls  Goal: Patient will remain free of falls  Description: INTERVENTIONS:  - Educate patient/family on patient safety including physical limitations  - Instruct patient to call for assistance with activity   - Consult OT/PT to assist with strengthening/mobility   - Keep Call bell within reach  - Keep bed low and locked with side rails adjusted as appropriate  - Keep care items and personal belongings within reach  - Initiate and maintain comfort rounds  - Make Fall Risk Sign visible to staff  - Offer Toileting every 2 Hours, in advance of need  - Initiate/Maintain bed alarm  - Obtain necessary fall risk management equipment: yellow socks  - Apply yellow socks and bracelet for high fall risk patients  - Consider moving patient to room near nurses station  Outcome: Progressing

## 2021-09-17 NOTE — DISCHARGE INSTRUCTIONS
Acute Diarrhea   WHAT YOU NEED TO KNOW:   Acute diarrhea starts quickly and lasts a short time, usually 1 to 3 days  It can last up to 2 weeks  You may not be able to control your diarrhea  Acute diarrhea usually stops on its own  DISCHARGE INSTRUCTIONS:   Return to the emergency department if:   · You feel confused  · Your heartbeat is faster than usual      · Your eyes look deeply sunken, or you have no tears when you cry  · You urinate less than usual, or your urine is dark yellow  · You have blood or mucus in your bowel movements  · You have severe abdominal pain  · You are unable to drink any liquids  Contact your healthcare provider if:   · Your symptoms do not get better with treatment  · You have a fever higher than 101 3°F (38 5°C)  · You have trouble eating and drinking because you are vomiting  · Your diarrhea does not get better in 7 days  · You have questions or concerns about your condition or care  Follow up with your healthcare provider as directed:  Write down your questions so you remember to ask them during your visits  Medicines:  · Diarrhea medicine  is an over-the-counter medicine that helps slow or stop your diarrhea  Do not  take this medicine unless your healthcare provider says it is okay  · Antibiotics  may be given to help treat an infection caused by bacteria  · Antiparasitics  may be given to treat an infection caused by parasites  · Take your medicine as directed  Contact your healthcare provider if you think your medicine is not helping or if you have side effects  Tell him of her if you are allergic to any medicine  Keep a list of the medicines, vitamins, and herbs you take  Include the amounts, and when and why you take them  Bring the list or the pill bottles to follow-up visits  Carry your medicine list with you in case of an emergency  Self-care:   · Drink liquids as directed    Liquids will help prevent dehydration caused by diarrhea  Ask your healthcare provider how much liquid to drink each day and which liquids are best for you  You may need to drink an oral rehydration solution (ORS)  An ORS has the right amounts of water, salts, and sugar you need to replace body fluids  You can buy an ORS at most grocery stores and pharmacies  · Eat foods that are easy to digest   Examples include rice, lentils, cereal, bananas, potatoes, and bread  It also includes some fruits (bananas, melon), well-cooked vegetables, and lean meats  Do not eat foods high in fiber, fat, and sugar  Do not drink alcohol until your diarrhea is gone  Prevent acute diarrhea:   · Wash your hands often  Use soap and water  Wash your hands before you eat or prepare food  Also wash your hands after you use the bathroom  Use an alcohol-based hand gel when soap and water are not available  · Keep bathroom surfaces clean  This helps prevent the spread of germs that cause acute diarrhea  · Wash fruits and vegetables well before you eat them  This can help remove germs that cause diarrhea  If possible, remove the skin from fruits and vegetables, or cook them well before you eat them  · [employer] and poultry as directed  Meat includes beef and pork  Poultry includes chicken, turkey, and duck  ? Cook ground meat  to 160°F      ? Cook ground poultry, whole poultry, or cuts of poultry  to at least 165°F  Remove the poultry from heat  Let it stand for 3 minutes before you eat it  ? Cook whole cuts of meat other than poultry  to at least 145°F  Remove the meat from heat  Let it stand for 3 minutes before you eat it  · Wash dishes that have touched raw meat or poultry with hot water and soap  This includes cutting boards, utensils, dishes, and serving containers  · Place raw or cooked meat or poultry in the refrigerator as soon as possible    Bacteria can grow in meat or poultry that is left at room temperature too long  · Do not eat raw or undercooked oysters, clams, or mussels  These foods may be contaminated and cause infection  · Drink only filtered or treated water when you travel  Do not put ice in your drinks  Drink bottled water whenever possible  © Copyright [employer] 2021 Information is for End User's use only and may not be sold, redistributed or otherwise used for commercial purposes  All illustrations and images included in CareNotes® are the copyrighted property of A D A M , Inc  or [facility] David Bardales   The above information is an  only  It is not intended as medical advice for individual conditions or treatments  Talk to your doctor, nurse or pharmacist before following any medical regimen to see if it is safe and effective for you  How to Stop Smoking   WHAT YOU NEED TO KNOW:   You will improve your health and the health of others around you if you stop smoking  Your risk for heart and lung disease, cancer, stroke, heart attack, and vision problems will also decrease  Your adolescent can help prevent or stop harm to his or her brain or body  This will help him or her become a healthy adult  You can benefit from quitting no matter how long you have smoked  DISCHARGE INSTRUCTIONS:   Prepare to stop smoking:  Nicotine is a highly addictive drug found in cigarettes  Withdrawal symptoms can happen when you stop smoking and make it hard to quit  These include anxiety, depression, irritability, trouble sleeping, and increased appetite  You increase your chances of success if you prepare to quit  · Set a quit date  Marita Workman a date that is within the next 2 weeks  Do not pick a day that you think may be stressful or busy  Write down the day or [place] it on your calender  · Tell friends and family that you plan to quit  Explain that you may have withdrawal symptoms when you try to quit  Ask them to support you   They may be able to encourage you and help reduce your stress to make it easier for you to quit  · Make a list of your reasons for quitting  Put the list somewhere you will see it every day, such as your refrigerator  You can look at the list when you have a craving  · Remove all tobacco and nicotine products from your home, car, and workplace  Also, remove anything else that will tempt you to smoke, such as lighters, matches, or ashtrays  Clean your car, home, and places at work that smell like smoke  The smell of smoke can trigger a craving  · Identify triggers that make you want to smoke  This may include activities, feelings, or people  Also write down 1 way you can deal with each of your triggers  For example, if you want to smoke as soon as you wake up, plan another activity during this time, such as exercise  · Make a plan for how you will quit  Learn about the tools that can help you quit, such as medicine, counseling, or nicotine replacement therapy  Choose at least 2 options to help you quit  · Help your adolescent make a plan to quit  The plan will be more successful if your adolescent makes his or her own decisions  Do not try to pressure him or her to quit immediately or in a certain way  Be supportive and offer help if needed  Tools to help you stop smoking:   · Counseling  from a trained healthcare provider can provide you with support and skills to quit smoking  The provider will also teach you to manage your withdrawal symptoms and cravings  You may receive counseling from one counselor, in group therapy, or through phone therapy called a quit line  · Nicotine replacement therapy (NRT)  such as nicotine patches, gum, or lozenges may help reduce your nicotine cravings  You may get these without a doctor's order  Do not use e-cigarettes or smokeless tobacco in place of cigarettes or to help you quit  They still contain nicotine  · Prescription medicines  such as nasal sprays or nicotine inhalers may help reduce your withdrawal symptoms  Other medicines may also be used to reduce your urge to smoke  Ask your healthcare provider about these medicines  You may need to start certain medicines 2 weeks before your quit date for them to work well  · Hypnosis  is a practice that helps guide you through thoughts and feelings  Hypnosis may help decrease your cravings and make you more willing to quit  · Acupuncture therapy  uses very thin needles to balance energy channels in the body  This is thought to help decrease cravings and symptoms of nicotine withdrawal     · Support groups  let you talk to others who are trying to quit or have already quit  It may be helpful to speak with others about how they quit  Manage your cravings:   · Avoid situations, people, and places that tempt you to smoke  Go to nonsmoking places, such as libraries or restaurants  Understand what tempts you and try to avoid these things  · Keep your hands busy  Hold things such as a stress ball or pen  · Put candy or toothpicks in your mouth  Keep lollipops, sugarless gum, or toothpicks with you at all times  · Do not have alcohol or caffeine  These drinks may tempt you to smoke  Drink healthy liquids such as water or juice instead  · Reward yourself when you resist your cravings  Rewards will motivate you and help you stay positive  · Do an activity that distracts you from your craving  Examples include cleaning, creating art, or gardening  Prevent weight gain after you quit:  You may gain a few pounds after you quit smoking  It is healthier for you to gain a few pounds than to continue to smoke  The following can help you prevent weight gain:  · Eat a variety of healthy foods  Healthy foods include fruits, vegetables, whole-grain breads, low-fat dairy products, beans, lean meats, and fish  Eat healthy snacks, such as low-fat yogurt, if you get hungry between meals  · Drink water before, during, and between meals    This will make your stomach feel full and help prevent you from overeating  Ask your healthcare provider how much liquid to drink each day and which liquids are best for you  · Be physically active  Activity may help reduce your cravings and reduce stress  Take a walk or do some kind of physical activity every day  Ask your healthcare provider which activities are right for you  For support and more information:   · American [employer] Association  [address] [facility]  [address]  Phone: [facility] [address]  Phone: 2- 012 - 742-2526  Web Address: BIXI    · Smokefree  gov  Phone: 6- 454 - 185-9114  Web Address: www smokefree  [address] 2021 Information is for End User's use only and may not be sold, redistributed or otherwise used for commercial purposes  All illustrations and images included in CareNotes® are the copyrighted property of A D A M , Inc  or [address] [place] [address] "[employer]  The above information is an  only  It is not intended as medical advice for individual conditions or treatments  Talk to your doctor, nurse or pharmacist before following any medical regimen to see if it is safe and effective for you  Hypokalemia   WHAT YOU NEED TO KNOW:   Hypokalemia is a low level of potassium in your blood  Potassium helps control how your muscles, heart, and digestive system work  Hypokalemia occurs when your body loses too much potassium or does not absorb enough from food  DISCHARGE INSTRUCTIONS:   Seek care immediately if:   · You cannot move your arm or leg  · You have a fast or irregular heartbeat  · You are too tired or weak to stand up  Contact your healthcare provider if:   · You are vomiting, or you have diarrhea  · You have numbness or tingling in your arms or legs  · Your symptoms do not go away or they get worse  · You have questions or concerns about your condition or care      Medicines:   · Potassium  will be given to bring your potassium levels back to normal     · Take your medicine as directed  Contact your healthcare provider if you think your medicine is not helping or if you have side effects  Tell him of her if you are allergic to any medicine  Keep a list of the medicines, vitamins, and herbs you take  Include the amounts, and when and why you take them  Bring the list or the pill bottles to follow-up visits  Carry your medicine list with you in case of an emergency  Eat foods that are high in potassium:  Foods that are high in potassium include bananas, oranges, tomatoes, potatoes, and avocado  Chinchilla beans, turkey, salmon, lean beef, yogurt, and milk are also high in potassium  Ask your healthcare provider or dietitian for more information about foods that are high in potassium  Follow up with your healthcare provider as directed:  Write down your questions so you remember to ask them during your visits  © Copyright [employer] 2021 Information is for End User's use only and may not be sold, redistributed or otherwise used for commercial purposes  All illustrations and images included in CareNotes® are the copyrighted property of A D A M , Inc  or [facility] David Bardales   The above information is an  only  It is not intended as medical advice for individual conditions or treatments  Talk to your doctor, nurse or pharmacist before following any medical regimen to see if it is safe and effective for you  Hypomagnesemia   WHAT YOU NEED TO KNOW:   Hypomagnesemia is a condition that develops when the amount of magnesium in your body is too low  Magnesium is a mineral that helps your heart, muscles, and nerves work normally  It also helps strengthen your bones  DISCHARGE INSTRUCTIONS:   Seek care immediately if:   · You have numbness and tingling in your arms or legs  · You have painful muscle spasms and tremors in your arms or legs  · You are not able to move your muscles, and you have trouble thinking clearly       · Your heartbeat is faster than usual, or is irregular  · You have a seizure  Contact your healthcare provider if:   · You have fatigue and muscle tremors or twitching  · You become irritable and have trouble sleeping  · You have questions or concerns about your condition or care  Prevent hypomagnesemia:   · Manage health conditions  by following your treatment plan  Health conditions such as congestive heart failure, diabetes, and chronic diarrhea can put you at risk for hypomagnesemia  · Eat foods that contain magnesium every day  Ask your dietitian or healthcare provider how much magnesium you need each day  · Limit or do not drink alcohol  Alcohol can prevent your body from absorbing magnesium  Alcohol also makes your body release large amounts of magnesium through your urine  · You may need to take a magnesium supplement  Ask your healthcare provider which supplement to take and how often to take it  Foods that contain magnesium:   · Almonds, cashews, peanuts, and peanut butter    · Dark green leafy vegetables, such as spinach    · Raisins, bananas, apples, broccoli, and carrots    · Soy milk and soy beans    · Black beans and kidney beans    · Whole-wheat bread and brown rice    · Shredded-wheat cereal, oatmeal, and other breakfast cereals fortified with magnesium    · Plain low-fat yogurt and milk    · Cooked halibut    Follow up with your healthcare provider as directed: You may need more tests to monitor your condition  Write down your questions so you remember to ask them during your visits  © Copyright [employer] 2021 Information is for End User's use only and may not be sold, redistributed or otherwise used for commercial purposes  All illustrations and images included in CareNotes® are the copyrighted property of A D A M , Inc  or [facility] David Bardales   The above information is an  only   It is not intended as medical advice for individual conditions or treatments  Talk to your doctor, nurse or pharmacist before following any medical regimen to see if it is safe and effective for you  Syncope   WHAT YOU NEED TO KNOW:   Syncope is also called fainting or passing out  Syncope is a sudden, temporary loss of consciousness, followed by a fall from a standing or sitting position  Syncope ranges from not serious to a sign of a more serious condition that needs to be treated  You can control some health conditions that cause syncope  Your healthcare providers can help you create a plan to manage syncope and prevent episodes  DISCHARGE INSTRUCTIONS:   Seek care immediately if:   · You are bleeding because you hit your head when you fainted  · You suddenly have double vision, difficulty speaking, numbness, and cannot move your arms or legs  · You have chest pain and trouble breathing  · You vomit blood or material that looks like coffee grounds  · You see blood in your bowel movement  Contact your healthcare provider if:   · You have new or worsening symptoms  · You have another syncope episode  · You have a headache, fast heartbeat, or feel too dizzy to stand up  · You have questions or concerns about your condition or care  Medicines:   · Medicines  may be needed to help your heart pump strongly and regularly  Your healthcare provider may also make changes to any medicines that are causing syncope  · Take your medicine as directed  Contact your healthcare provider if you think your medicine is not helping or if you have side effects  Tell him or her if you are allergic to any medicine  Keep a list of the medicines, vitamins, and herbs you take  Include the amounts, and when and why you take them  Bring the list or the pill bottles to follow-up visits  Carry your medicine list with you in case of an emergency      Follow up with your healthcare provider as directed:  Write down your questions so you remember to ask them during your visits  Manage syncope:   · Keep a record of your syncope episodes  Include your symptoms and your activity before and after the episode  The record can help your healthcare provider find the cause of your syncope and help you manage episodes  · Sit or lie down when needed  This includes when you feel dizzy, your throat is getting tight, and your vision changes  Raise your legs above the level of your heart  · Take slow, deep breaths if you start to breathe faster with anxiety or fear  This can help decrease dizziness and the feeling that you might faint  · Check your blood pressure often  This is important if you take medicine to lower your blood pressure  Check your blood pressure when you are lying down and when you are standing  Ask how often to check during the day  Keep a record of your blood pressure numbers  Your healthcare provider may use the record to help plan your treatment  Prevent a syncope episode:   · Move slowly and let yourself get used to one position before you move to another position  This is very important when you change from a lying or sitting position to a standing position  Take some deep breaths before you stand up from a lying position  Stand up slowly  Sudden movements may cause a fainting spell  Sit on the side of the bed or couch for a few minutes before you stand up  If you are on bedrest, try to be upright for about 2 hours each day, or as directed  Do not lock your legs if you are standing for a long period of time  Move your legs and bend your knees to keep blood flowing  · Follow your healthcare provider's recommendations  Your provider may  recommend that you drink more liquids to prevent dehydration  You may also need to have more salt to keep your blood pressure from dropping too low and causing syncope  Your provider will tell you how much liquid and sodium to have each day  He or she will also tell you how much physical activity is safe for you  This will depend on what is causing your syncope  · Watch for signs of low blood sugar  These include hunger, nervousness, sweating, and fast or fluttery heartbeats  Talk with your healthcare provider about ways to keep your blood sugar level steady  · Do not strain if you are constipated  You may faint if you strain to have a bowel movement  Walking is the best way to get your bowels moving  Eat foods high in fiber to make it easier to have a bowel movement  Good examples are high-fiber cereals, beans, vegetables, and whole-grain breads  Prune juice may help make bowel movements softer  · Be careful in hot weather  Heat can cause a syncope episode  Limit activity done outside on hot days  Physical activity in hot weather can lead to dehydration  This can cause an episode  © Copyright [employer] 2021 Information is for End User's use only and may not be sold, redistributed or otherwise used for commercial purposes  All illustrations and images included in CareNotes® are the copyrighted property of A D A M , Inc  or [facility] David Bardales   The above information is an  only  It is not intended as medical advice for individual conditions or treatments  Talk to your doctor, nurse or pharmacist before following any medical regimen to see if it is safe and effective for you

## 2021-09-17 NOTE — QUICK NOTE
Addendum to my H&P  Urine culture shows gram negative saumya Enteric like  Patient denies any urinary symptoms  She has no leukocytosis or fever  Hold off on antibiotic

## 2021-09-17 NOTE — CASE MANAGEMENT
Case Management Assessment & Discharge Planning Note    Patient name Ramez Salazar  Location /-01 MRN [id number]  : 1970 Date 2021       Current Admission Date: 9/15/2021  Current Admission Diagnosis:  Hypokalemia  Previous Admission - Discharge Date:    LOS (days): 1  Geometric Mean LOS (GMLOS) (days): 2 30  Days to GMLOS:1 3 Previous Discharge Diagnosis:  No discharge information exists for this patient  OBJECTIVE:    Risk of Unplanned Readmission Score: 12   Bundle(if applicable):    Current admission status: Inpatient  Referral Reason:  (Discharge planning)    Preferred Pharmacy:   [id number] [address], [address] [facility] [address]  [address]  [place] [address]  Phone: [phone] Fax: [phone]    [address] [place] [address]  Phone: [phone] Fax: [phone]    Primary Care Provider: HELGA Callejas    Primary Insurance: ODALIS FINLEY PENDING  Secondary Insurance:     ASSESSMENT:  Active Health Care Agents    There are no active Health Care Agents on file  [facility]: carbon    Readmission Root Cause  30 Day Readmission: No    Patient Information  Mental Status: Alert  During Assessment patient was accompanied by: Not accompanied during assessment  Assessment information provided by[de-identified] Patient  Primary Caregiver: Self  Support Systems: Parent  What city do you live in?: Via docTrackr entry access options   Select all that apply : Stairs  Number of steps to enter home : 5  Do the steps have railings?: Yes  Type of Current Residence: [address] [place] [address] home  Upon entering residence, is there a bedroom on the main floor (no further steps)?: Yes  Upon entering residence, is there a bathroom on the main floor (no further steps)?: Yes  Living Arrangements: Lives Alone  Is patient a ?: No    Activities of Daily Living Prior to Admission  Functional Status: Independent  Completes ADLs independently?: Yes  Ambulates independently?: Yes  Does patient use assisted devices?: No  Does patient currently own DME?: No  Does patient have a history of Outpatient Therapy (PT/OT)?: No  Does the patient have a history of Short-Term Rehab?: No  Does patient currently have [place] [id number]?: No      Patient Information Continued  Income Source: Employed  Does patient have prescription coverage?: Yes  Does patient receive dialysis treatments?: No  Does patient have a history of substance abuse?: No  Does patient have a history of Mental Health Diagnosis?: No    PHQ 2/9 Screening   Reviewed PHQ 2/9 Depression Screening Score?: No    Means of Transportation  Means of Transport to Appts[de-identified] Family transport  In the past 12 months, has lack of transportation kept you from medical appointments or from getting medications?: No  In the past 12 months, has lack of transportation kept you from meetings, work, or from getting things needed for daily living?: No  Was application for public transport provided?: No    DISCHARGE DETAILS:    Discharge planning discussed with[de-identified] [address] [place] [address]         Is the patient interested in [place] [address] at discharge?: No    DME Referral Provided  Referral made for DME?: No    Other Referral/Resources/Interventions Provided:  Financial Resources Provided: Financial Counselor    Discharge Destination Plan[de-identified] Home    Pt does not have insurance  TC to Kathryn Linares financial counselor and left a VM  Provided information to the pt about same  Pt is IPA, works at [employer]  Will not have any needs  Family will transport

## 2021-09-17 NOTE — DISCHARGE SUMMARY
[facility]  Discharge- Nguyen Dumont 1970, 44 y o  female MRN: [id number]  Unit/Bed#: -Shaq Encounter: [id number]  Primary Care Provider: HELGA Del Toro   Date and time admitted to hospital: 9/15/2021  9:47 PM    * Hypokalemia  Assessment & Plan  · Likely secondary to GI losses with ongoing diarrhea and poor diet   · Patient is status post p o  As well as IV repletion  · This has resolved         Syncope  Assessment & Plan  · Likely related to volume depletion and electrolyte abnormality  · Feeling much improved with IV fluid  · No significant arrhythmia noted on the EKG/telemetry    Tobacco abuse  Assessment & Plan  · Smoking cessation discussed    Hyponatremia  Assessment & Plan  · Hypovolemia hyponatremia  · Has been having poor diet and stressful job with diarrhea worsening over the past month     Hypomagnesemia  Assessment & Plan  · repleted     Diarrhea  Assessment & Plan  · C diff- negative   · Stool cultures is pending at this time  · Nonbloody  · GI outpatient referral  · Diet discussed at length  · Suspected that this is due to stressful job, poor diet however will need to rule out any possible malignancy as she has no prior colonoscopy or endoscopy  The patient is being discharged prior to 2 midnights stays as her condition is clinically much improved earlier than expected      Discharging Physician / Practitioner: HELGA Jules  PCP: [facility]   Admission Date:   Admission Orders (From admission, onward)     Ordered        09/16/21 1428  Inpatient Admission  Once         09/16/21 0014  Place in Observation  Once                   Discharge Date: 09/17/21    Medical Problems     Resolved Problems  Date Reviewed: 9/17/2021    None                Consultations During Hospital Stay:  · None    Procedures Performed:   · None    Significant Findings / Test Results:   · CT abdomen and pelvis 1   No acute abnormality in the abdomen or pelvis  2   A 4 mm right lower lobe solid pulmonary nodule   If the patient is low risk, no follow-up is recommended   If the patient is high risk, follow-up chest CT in 12 months is recommended, per Fleischner Society guidelines  3   Hepatic steatosis  4   Cross fused renal ectopia  · CT cervical spine No cervical spine fracture or traumatic malalignment  Incidental thyroid nodule(s) for which nonemergent thyroid ultrasound is recommended  · CT head No intracranial hemorrhage or calvarial fracture    Incidental Findings:   · Thyroid nodules and lung nodule     Test Results Pending at Discharge (will require follow up): · Final stool cultures     Outpatient Tests Requested:  · Follow-up PCP  · Follow-up with GI    Complications:     None    Reason for Admission:  Syncope    Hospital Course:     Dalton Garcia is a 44 y o  female patient who originally presented to the hospital on 9/15/2021 due to syncope  Please refer to H&P for initial presenting complaint  Brief the patient had a syncopal episode while sitting on the curve waiting for a ride smoking cigarette  She subsequently was admitted  The patient was found to have multiple electrolytes abnormalities  She received IV fluid  Electrolytes were repleted  Suspected that syncopal episode was related to volume depletion with positive orthostatic hypotension  CT abdomen and pelvis without any acute/infectious process  C diff stool was negative so far  Stool cultures pending  Patient would like to go home and I discussed this in detail with her that if there is any positive finding from her stool culture she will have to follow-up with her PCP right away  She is made aware of the incidental finding of thyroid nodules which will need follow-up ultrasound of the thyroid and right lower lobe lung nodules  Due to her smoking history we recommend that she follow-up with CT scan within 1 year    Additionally recommend that the patient follow-up with gastroenterology to get a colonoscopy and may be with endoscopy  Dietitian has seen the patient as well and discussed diet with her  She states that her diarrhea has been much improved during the hospital stay  Please see above list of diagnoses and related plan for additional information  Condition at Discharge: good     Discharge Day Visit / Exam:     Subjective:  Feeling much improved  Denies any nausea, vomiting, abdominal pain, a worsening diarrhea  Vitals: Blood Pressure: 162/77 (09/17/21 1059)  Pulse: 77 (09/17/21 1059)  Temperature: 97 8 °F (36 6 °C) (09/17/21 0803)  Temp Source: Temporal (09/17/21 0803)  Respirations: 18 (09/17/21 0803)  Height: 5' 1" (154 9 cm) (09/17/21 1532)  Weight - Scale: 47 6 kg (105 lb) (09/17/21 1531)  SpO2: 99 % (09/17/21 0803)  Exam:   Physical Exam  Vitals and nursing note reviewed  Constitutional:       General: She is not in acute distress  Appearance: Normal appearance  She is underweight  HENT:      Head: Normocephalic and atraumatic  Right Ear: External ear normal       Left Ear: External ear normal       Nose: Nose normal  No rhinorrhea  Mouth/Throat:      Mouth: Mucous membranes are moist       Pharynx: Oropharynx is clear  Eyes:      General:         Right eye: No discharge  Left eye: No discharge  Pupils: Pupils are equal, round, and reactive to light  Cardiovascular:      Rate and Rhythm: Normal rate and regular rhythm  Pulses: Normal pulses  Heart sounds: Normal heart sounds  No murmur heard  Pulmonary:      Effort: Pulmonary effort is normal  No respiratory distress  Breath sounds: Normal breath sounds  Abdominal:      General: Bowel sounds are normal  There is no distension  Palpations: Abdomen is soft  There is no mass  Tenderness: There is no abdominal tenderness  Musculoskeletal:         General: No swelling or tenderness  Normal range of motion        Cervical back: Normal range of motion and neck supple  No muscular tenderness  Skin:     General: Skin is warm and dry  Capillary Refill: Capillary refill takes less than 2 seconds  Findings: No erythema or rash  Neurological:      General: No focal deficit present  Mental Status: She is alert and oriented to person, place, and time  Mental status is at baseline  Psychiatric:         Mood and Affect: Mood normal          Behavior: Behavior normal          Thought Content: Thought content normal          Judgment: Judgment normal          Discussion with Family:  None present during exam    Discharge instructions/Information to patient and family:   See after visit summary for information provided to patient and family  Provisions for Follow-Up Care:  See after visit summary for information related to follow-up care and any pertinent home health orders  Disposition:     Home      Planned Readmission:    No     Discharge Statement:  I spent 38 minutes discharging the patient  This time was spent on the day of discharge  I had direct contact with the patient on the day of discharge  Greater than 50% of the total time was spent examining patient, answering all patient questions, arranging and discussing plan of care with patient as well as directly providing post-discharge instructions  Additional time then spent on discharge activities  Discharge Medications:  See after visit summary for reconciled discharge medications provided to patient and family        ** Please Note: This note has been constructed using a voice recognition system **

## 2021-09-17 NOTE — ASSESSMENT & PLAN NOTE
· Likely secondary to GI losses with ongoing diarrhea and poor diet   · Patient is status post p o   As well as IV repletion  · This has resolved

## 2021-09-18 LAB
BACTERIA UR CULT: ABNORMAL
BACTERIA UR CULT: ABNORMAL
CAMPYLOBACTER DNA SPEC NAA+PROBE: NORMAL
SALMONELLA DNA SPEC QL NAA+PROBE: NORMAL
SHIGA TOXIN STX GENE SPEC NAA+PROBE: NORMAL
SHIGELLA DNA SPEC QL NAA+PROBE: NORMAL

## 2021-09-20 ENCOUNTER — TRANSITIONAL CARE MANAGEMENT (OUTPATIENT)
Dept: FAMILY MEDICINE CLINIC | Facility: CLINIC | Age: 51
End: 2021-09-20

## 2021-09-21 ENCOUNTER — OFFICE VISIT (OUTPATIENT)
Dept: FAMILY MEDICINE CLINIC | Facility: CLINIC | Age: 51
End: 2021-09-21
Payer: COMMERCIAL

## 2021-09-21 VITALS
WEIGHT: 109 LBS | OXYGEN SATURATION: 98 % | TEMPERATURE: 99.3 F | HEIGHT: 61 IN | DIASTOLIC BLOOD PRESSURE: 70 MMHG | HEART RATE: 100 BPM | BODY MASS INDEX: 20.58 KG/M2 | SYSTOLIC BLOOD PRESSURE: 120 MMHG | RESPIRATION RATE: 19 BRPM

## 2021-09-21 DIAGNOSIS — E83.42 HYPOMAGNESEMIA: ICD-10-CM

## 2021-09-21 DIAGNOSIS — E87.1 HYPONATREMIA: ICD-10-CM

## 2021-09-21 DIAGNOSIS — R19.7 DIARRHEA, UNSPECIFIED TYPE: ICD-10-CM

## 2021-09-21 DIAGNOSIS — Z72.0 TOBACCO ABUSE: Chronic | ICD-10-CM

## 2021-09-21 DIAGNOSIS — R91.1 PULMONARY NODULE: ICD-10-CM

## 2021-09-21 DIAGNOSIS — R55 SYNCOPE, UNSPECIFIED SYNCOPE TYPE: ICD-10-CM

## 2021-09-21 DIAGNOSIS — E87.6 HYPOKALEMIA: ICD-10-CM

## 2021-09-21 DIAGNOSIS — Z12.31 ENCOUNTER FOR SCREENING MAMMOGRAM FOR MALIGNANT NEOPLASM OF BREAST: Primary | ICD-10-CM

## 2021-09-21 DIAGNOSIS — E04.1 THYROID NODULE: ICD-10-CM

## 2021-09-21 PROCEDURE — 99215 OFFICE O/P EST HI 40 MIN: CPT | Performed by: NURSE PRACTITIONER

## 2021-09-21 NOTE — PROGRESS NOTES
[facility]        NAME: Kathie Pool is a 44 y o  female  : 1970    MRN: [id number]  DATE: 2021  TIME: 1:30 PM    Assessment and Plan   Screening for cervical cancer [Z12 4]  1  Screening for cervical cancer     2  Encounter for screening mammogram for malignant neoplasm of breast  Mammo screening bilateral w 3d & cad   3  Screening for colon cancer     4  Syncope, unspecified syncope type     5  Tobacco abuse     6  Hyponatremia     7  Hypokalemia     8  Hypomagnesemia     9  Diarrhea, unspecified type     10  Pulmonary nodule  CT chest wo contrast   11  Thyroid nodule  US thyroid         Patient Instructions     There are no Patient Instructions on file for this visit  Chief Complaint     Chief Complaint   Patient presents with    Transition of Care Management         History of Present Illness       Patient here for hospital follow up visit after an episode of syncope  Patient reports that she is feeling better         "Hospital Course:      Kathie Pool is a 44 y o  female patient who originally presented to the hospital on 9/15/2021 due to syncope  Please refer to H&P for initial presenting complaint  Brief the patient had a syncopal episode while sitting on the curve waiting for a ride smoking cigarette  She subsequently was admitted  The patient was found to have multiple electrolytes abnormalities  She received IV fluid  Electrolytes were repleted  Suspected that syncopal episode was related to volume depletion with positive orthostatic hypotension  CT abdomen and pelvis without any acute/infectious process  C diff stool was negative so far  Stool cultures pending  Patient would like to go home and I discussed this in detail with her that if there is any positive finding from her stool culture she will have to follow-up with her PCP right away    She is made aware of the incidental finding of thyroid nodules which will need follow-up ultrasound of the thyroid and right lower lobe lung nodules  Due to her smoking history we recommend that she follow-up with CT scan within 1 year  Additionally recommend that the patient follow-up with gastroenterology to get a colonoscopy and may be with endoscopy  Dietitian has seen the patient as well and discussed diet with her  She states that her diarrhea has been much improved during the hospital stay "     TCM Call (since 8/21/2021)     Date and time call was made  9/20/2021 10:03 AM    Patient was hospitialized at  [address]        Date of Admission  09/15/21    Date of discharge  09/19/21    Diagnosis  hypokalemia    Disposition  Home    Current Symptoms  None      TCM Call (since 8/21/2021)     Post hospital issues  None    Scheduled for follow up? Yes    Did you obtain your prescribed medications  Yes    Do you need help managing your prescriptions or medications  No    Is transportation to your appointment needed  No    I have advised the patient to call PCP with any new or worsening symptoms    Lorene Goodeadelinelexie,     Living Arrangements  Parents    Support System  Parent    The type of support provided  Emotional; Physical; Other (comment)    Do you have social support  Yes, as much as I need      * Hypokalemia  Assessment & Plan  · Likely secondary to GI losses with ongoing diarrhea and poor diet   · Patient is status post p o   As well as IV repletion  · This has resolved            Syncope  Assessment & Plan  · Likely related to volume depletion and electrolyte abnormality  · Feeling much improved with IV fluid  · No significant arrhythmia noted on the EKG/telemetry     Tobacco abuse  Assessment & Plan  · Smoking cessation discussed     Hyponatremia  Assessment & Plan  · Hypovolemia hyponatremia  · Has been having poor diet and stressful job with diarrhea worsening over the past month      Hypomagnesemia  Assessment & Plan  · repleted      Diarrhea  Assessment & Plan  · C diff- negative   · Stool cultures is pending at this time  · Nonbloody  · GI outpatient referral  · Diet discussed at length  · Suspected that this is due to stressful job, poor diet however will need to rule out any possible malignancy as she has no prior colonoscopy or endoscopy           Review of Systems   Review of Systems   Respiratory: Negative  Cardiovascular: Negative  Gastrointestinal: Positive for abdominal distention (bloating) and diarrhea  Negative for abdominal pain, constipation, nausea and vomiting  Neurological: Negative  Psychiatric/Behavioral: Negative  Negative for decreased concentration and dysphoric mood  PHQ-9 Depression Screening    PHQ-9:   Frequency of the following problems over the past two weeks:      Little interest or pleasure in doing things: 0 - not at all  Feeling down, depressed, or hopeless: 0 - not at all  PHQ-2 Score: 0        Current Medications       Current Outpatient Medications:     aspirin 325 mg tablet, Take 325 mg by mouth daily, Disp: , Rfl:     magnesium oxide (MAG-OX) 400 mg, Take 1 tablet (400 mg total) by mouth 2 (two) times a day for 5 days, Disp: 10 tablet, Rfl: 0    multivitamin-iron-minerals-folic acid (CENTRUM) chewable tablet, Chew 1 tablet daily, Disp: , Rfl:     Current Allergies     Allergies as of 09/21/2021    (No Known Allergies)            The following portions of the patient's history were reviewed and updated as appropriate: allergies, current medications, past family history, past medical history, past social history, past surgical history and problem list      Past Medical History:   Diagnosis Date    Cervical cancer ([facility] [address] )        Past Surgical History:   Procedure Laterality Date    SHOULDER SURGERY Left        No family history on file  Medications have been verified          Objective   /70 (BP Location: Left arm, Patient Position: Sitting, Cuff Size: Standard)   Pulse 100   Temp 99 3 °F (37 4 °C)   Resp 19   Ht 5' 1" (1 549 m)   Wt 49 4 kg (109 lb)   SpO2 98%   BMI 20 60 kg/m²        Physical Exam     Physical Exam  Vitals and nursing note reviewed  Constitutional:       General: She is not in acute distress  Appearance: Normal appearance  She is well-developed  She is not ill-appearing  Eyes:      General: Lids are normal    Cardiovascular:      Rate and Rhythm: Normal rate and regular rhythm  Heart sounds: Normal heart sounds, S1 normal and S2 normal  No murmur heard  Pulmonary:      Effort: Pulmonary effort is normal  No respiratory distress  Breath sounds: Normal breath sounds  No decreased breath sounds or wheezing  Musculoskeletal:         General: No tenderness or deformity  Normal range of motion  Skin:     General: Skin is warm  Findings: No erythema or rash  Neurological:      Mental Status: She is alert and oriented to person, place, and time  Psychiatric:         Behavior: Behavior normal  Behavior is cooperative  Thought Content:  Thought content normal

## 2021-10-22 NOTE — UTILIZATION REVIEW
URGENT/EMERGENT  INPATIENT/SPU AUTHORIZATION REQUEST    Date: 10/22/21            # Pages in this Request:     X New Request   Additional Information for PA#:     Office Contact Name:  Coy Lobato Title: Utilization Review, Registed Nurse     Phone: [phone]  Ext  Availability (Date/Time): Wednesday - Friday 8 am- 4 pm    X Inpatient Review  SPU Review        Current       X Late Pick-up   · How your facility was first notified of the Late Pick-up: PATHS  · When your facility was first notified of the Late Pick-up (date): 10/19         RECIPIENT INFORMATION    Recipient EE#:[id number]   Recipient Name: Winifred Glover    YOB: 1970  44 y o  Recipient Alias:     Gender:   Male X Female Medicaid Eligibility ([address] [place] [address]): INSURANCE INFORMATION    (All other private or governmental health insurance benefits must be utilized prior to billing the [place] Program)    Was this admission the result of an MVA, other accident, assault, injury, fall, gunshot, bite etc ? Yes X No                   If yes, provide a brief description of the incident  Does the recipient have other insurance coverage? Yes X No        Insurance Company Name/Policy #      Did that insurance pay on this claim? Yes  No        Did that insurance deny this claim? Yes  No    If yes, reason for denial:      Does the recipient have Medicare? Yes X No        Did Medicare exhaust prior to this admission? Yes  No            Did Medicare partially pay this claim? Yes  No        Did that insurance deny this claim? Yes  No    If yes, reason for denial:          Was the recipient a prisoner at the time of admission?    Yes X No            PROVIDER INFORMATION    Hospital Name: [facility] [address] [place] [address] Provider ID#: [id number]    [address] [place] [address] Physician Name: [facility] [place] Provider ID#: [id number]        ADMISSION INFORMATION    Type of Admission: (please choose one)    X ED      Direct    If yes, from where? Transfer    If yes, transferring hospital (inpatient, rehab, or psych) Provider Name/Provider ID#: Admission Floor or Unit Type: MED-SURG    Dates/Times:        ED Date/Time: 9/15/2021  2146        Observation Date/Time: 9/16/2021  0014        Admission Date/Time: 9/16/21  1428        Discharge or Transfer Date/Time: 9/17/2021  1553        DIAGNOSIS/PROCEDURE CODES    Primary Diagnosis Code/Primary Diagnosis Code description:   ORTHOSTATIC HYPOTENSION I95 1  HYPOKALEMIA E87 6  HYPO-OSMOLALITY AND HYPONATREMIA E87 1  (MAY RE-ORDER DX CODES)  Additional Diagnosis Code(s) and Description(s)-(up to three additional codes):     Procedure Code (one) and description:NONE        CLINICAL INFORMATION - PRIOR ADMISSION ONLY    Is there a prior admission with a discharge date within 30 days of the date of this admission? X No (Proceed to the next section - "Clinical Information - General Review Checklist:)      Yes (Provide the following information)     Prior admission dates:    MA Prior Authorization Number:        Review Outcome:     Diagnosis Code(s)/Description:    Procedure Code/Description:    Findings:    Treatment:    Condition on Discharge:   Vitals:    Labs:   Imaging:   Medications: Follow-up Instructions:    Disposition:        CLINICAL INFORMATION - GENERAL REVIEW CHECKLIST    EMERGENCY DEPARTMENT: (Proceed to "ADMISSION" if Direct Admission)    Presenting Signs/Symptoms:    Fall       trauma evAL  pt was found on sidewalk by coworker  unwittnessed  pt states she has had abdominal pain x 3 weeks and diarrhea for about 3 weeks         Initial Presentation:      44 y  o  female who presents with syncope x1 this evening   Patient presents ER for further evaluation treatment of her syncopal episode x1 this evening   Patient states that she was sitting on the curb waiting for a ride smoking of cigarettes that was the last thing she recalls before her friend was standing over her and there was an ambulance there      Patient states that she has had ongoing diarrhea for the past 2 months which recently became worse approximately 1 month ago when she states that she has been having up to 5 bowel movements a day there accompanied with mucus but no bright red blood per rectum   Patient does not currently follow with GI has never had a colonoscopy      Patient denies any lightheadedness, dizziness, chest pain, palpitations or shortness of breath prior to her syncopal episode and she is unsure how long she was unconscious   Patient denies any further trauma      Will admit for monitoring and repletion of  Electrolytes (Mg/K  repleted in ER)  Cont Telemetry, trend Trops & orthostatic vs, serial EKGs  Send stool to lab for studies  Hydrate w/IVF NS         Date:    9/16      Day 2:   Pt reports 3 episodes non bloody diarrhea this am   Minimal appetite/intake  POS [employer] on physician exam,  Ongoing diarrhea and tachycardia =  Will cont IVF for rehydration  No major events on tele,   Trop neg x3  Initiate daily Mg    Replete K       Medication/treatment prior to arrival in the ED:     Past Medical History:     Past Medical History:   Diagnosis Date    Cervical cancer [facility])        Clinical Exam:     Initial Vital Signs: (Temp, Pulse, Resp, and BP)   ED Triage Vitals   Temperature Pulse Respirations Blood Pressure SpO2   09/15/21 2145 09/15/21 2145 09/15/21 2145 09/15/21 2145 09/15/21 2145   99 1 °F (37 3 °C) 82 16 165/88 98 %      Temp Source Heart Rate Source Patient Position - Orthostatic VS BP Location FiO2 (%)   09/15/21 2145 09/15/21 2145 09/15/21 2145 09/16/21 1647 --   Tympanic Monitor Sitting Left arm       Pain Score       09/16/21 1648       6           Pertinent Repeat Vital Signs: (include times they were obtained)  09/15/21 2245   --   85   18   168/80   115   98 %   --   --   09/15/21 2230   --   96   22   171/91Abnormal    124   98 %              09/16/21 1115   --   62   19   127/62   89   97 %   --   --   09/16/21 0947   --   --   --   160/79   --   --   --   --   09/16/21 0715   --   63   28Abnormal    150/66   --   97 %   --   --   09/16/21 0714   --   70   29Abnormal    150/66   --   98 %           Pertinent Sustained Findings: (include times they were obtained)    Weight in Kilograms:    09/15/21 99 1 kg (218 lb 7 6 oz)       Pertinent Labs (results):  Results from last 7 days   Lab Units 09/16/21  0614 09/15/21  2226   WBC Thousand/uL 9 20 7 90   HEMOGLOBIN g/dL 10 8* 11 4*   HEMATOCRIT % 31 1* 33 5*   PLATELETS Thousands/uL 153 152   NEUTROS ABS Thousands/µL 4 50 4 90                Results from last 7 days   Lab Units 09/16/21  0614 09/15/21  2226   SODIUM mmol/L 138 131*   POTASSIUM mmol/L 3 1* 2 5*   CHLORIDE mmol/L 107 97*   CO2 mmol/L 23 24   ANION GAP mmol/L 8 10   BUN mg/dL 3* 6*   CREATININE mg/dL 0 43* 0 63   EGFR ml/min/1 73sq m 117 103   CALCIUM mg/dL 7 7* 8 2*   MAGNESIUM mg/dL 1 7* 1 0*   PHOSPHORUS mg/dL 2 0*  --                     Results from last 7 days   Lab Units 09/16/21  0614 09/15/21  2226   GLUCOSE RANDOM mg/dL 96 182*             Results from last 7 days   Lab Units 09/16/21  1141 09/16/21  0614 09/16/21  0321   TROPONIN I ng/mL <0 03 <0 03 <0 03               Results from last 7 days   Lab Units 09/15/21  2226   PROTIME seconds 13 4   INR   1 01   PTT seconds 26           Results from last 7 days   Lab Units 09/15/21  2226   CRP mg/L <5 0               Results from last 7 days   Lab Units 09/15/21  2345   CLARITY UA   Clear   COLOR UA   Yellow   SPEC GRAV UA   1 010   PH UA   6 0   GLUCOSE UA mg/dl Negative   KETONES UA mg/dl Trace*   BLOOD UA   Trace-Intact*   PROTEIN UA mg/dl Negative   NITRITE UA   Negative   BILIRUBIN UA   Negative   UROBILINOGEN UA E U /dl 0 2   LEUKOCYTES UA   1+*   WBC UA /hpf 10-20*   RBC UA /hpf 1-2   BACTERIA UA /hpf Moderate*   EPITHELIAL CELLS WET PREP /hpf Occasional           Results from last 7 days   Lab Units 09/15/21  2345   AMPH/METH   Negative   BARBITURATE UR   Negative   BENZODIAZEPINE UR   Negative   COCAINE UR   Negative   METHADONE URINE   Negative   OPIATE UR   Negative   PCP UR   Negative   THC UR   Negative       Radiology (results):  TRAUMA - CT head wo contrast   ED Interpretation by Maximus Johansen DO (09/15 2320)   No acute intracranial abnormalities       Final Result by Mariaelena Garber MD (09/15 2315)       No intracranial hemorrhage or calvarial fracture       TRAUMA - CT spine cervical wo contrast   ED Interpretation by Maximus Johansen DO (09/15 2324)   No acute fracture deformity of the spine   Thyroid nodule noted patient aware       Final Result by Mariaelena Garber MD (09/15 2322)       No cervical spine fracture or traumatic malalignment       EKG (results): EKG:  Normal sinus rhythm at a rate 89 with first-degree AV block and prolonged QTC interval of 503 milliseconds    Other tests (results):    Tests pending final results:    Treatment in the ED:   Medication Administration from 09/15/2021 2146 to 09/16/2021 1628       Date/Time Order Dose Route Action Action by Comments                09/15/2021 2225 sodium chloride 0 9 % bolus 1,000 mL 1,000 mL Intravenous New Bag       09/16/2021 0025 potassium chloride (K-DUR,KLOR-CON) CR tablet 20 mEq 20 mEq Oral Given                  09/16/2021 0026 potassium chloride 20 mEq IVPB (premix) 20 mEq Intravenous New Bag                  09/16/2021 0026 magnesium sulfate 2 g/50 mL IVPB (premix) 2 g 2 g Intravenous New Bag       09/16/2021 0947 lisinopril (ZESTRIL) tablet 10 mg 10 mg Oral Given       09/16/2021 0947 aspirin tablet 325 mg 325 mg Oral Given       09/16/2021 1431 sodium chloride 0 9 % with KCl 20 mEq/L infusion (premix) 125 mL/hr Intravenous Restarted                  09/16/2021 0209 sodium chloride 0 9 % with KCl 20 mEq/L infusion (premix) 125 mL/hr Intravenous New Bag       09/16/2021 0207 nicotine (NICODERM CQ) 21 mg/24 hr TD 24 hr patch 1 patch 1 patch Transdermal Medication Applied                  09/16/2021 0208 heparin (porcine) subcutaneous injection 5,000 Units 5,000 Units Subcutaneous Given       09/16/2021 1201 potassium chloride (K-DUR,KLOR-CON) CR tablet 40 mEq 40 mEq Oral Given                  09/16/2021 1204 magnesium sulfate 2 g/50 mL IVPB (premix) 2 g 2 g Intravenous New Bag       09/16/2021 1203 magnesium oxide (MAG-OX) tablet 400 mg 400 mg Oral Given             Meds: Name, dose, route, time, number of doses given        Nebulizer treatments: Type, total number given      IVs: Type, rate, total amt  given          Other treatments:       Change in condition while in the ED:       Response to ED Treatment:           OBSERVATION: (Proceed to "ADMISSION" if Direct Admission)    Orders written during the observation period   Telem, serial ekgs & trops,  Cont cervical collar;  Stool to lab;  orthoatic bp x1;  HOURLY VS ,  Orthostatic bp, daily bmp      Meds Name, dose, route, time, how may doses given:  aspirin, 325 mg, Oral, Daily  heparin (porcine), 5,000 Units, Subcutaneous, Q12H [address]  lisinopril, 10 mg, Oral, Daily  magnesium oxide, 400 mg, Oral, BID  nicotine, 1 patch, Transdermal, Daily     9/16 @ noon - IV MG sulfate x1 ,  KDur 40 meq po x1,      Continuous IV Infusions:  sodium chloride 0 9 % with KCl 20 mEq/L, 125 mL/hr, Intravenous, Continuous        PRN Meds:  acetaminophen, 650 mg, Oral, Q6H PRN  ondansetron, 4 mg, Intravenous, Q6H PRN    PRN Meds Name, dose, route, time, how many doses given within the first 24 hrs :  IVs Type, rate, and total amt   ordered/given:  Labs, imaging, other:  Consults and findings:  Hypomagnesemia  Assessment & Plan  Patient is status post IV repletion in the ER, will continue to monitor with repeat labs in a m      Syncope  Assessment & Plan  · Will place on telemetry  · Trend cardiac enzymes  · Obtain serial EKGs  · Obtain orthostatic vital signs Q shift     Tobacco abuse  Assessment & Plan  Will give nicotine 21 mg transdermal daily     Hyponatremia  Assessment & Plan  Will hydrate with normal saline 125 cc/hour continue to monitor with repeat labs in a m      Diarrhea  Assessment & Plan  · Will send stool for C diff as well as enteric panel  · Obtain CRP  · Patient will likely need evaluation by GI as an outpatient as well as colonoscopy        * Hypokalemia  Assessment & Plan  · Placed in observation telemetry  · Likely secondary to GI losses  · Patient is status post p o  As well as IV repletion in the ER  · Will place on telemetry  · Will hydrate with normal saline with 20 mEq of KCl at 125 cc/hour           Test Results during the observation period  Pertinent Lab tests (dates/results):  Culture results (blood, urine, spinal, wound, respiratory, etc ):  Imaging tests (dates/results):  EKG (dates/results):   Other test (dates/results):  Tests pending (dates/results):    Surgical or Invasive Procedures during the observation period  Name of surgery/procedure:  Date & Time:  Patient Response:  Post-operative orders:  Operative Report/Findings:    Response to Treatment, Major Change in Condition, Major Charge in Treatment during the observation period  9/17   CHANGED to INPT  STATUS   Due to  Continuation of  Diarrhea,  Pt requires ongoing IVF and monitoring/repletion of  K+ and Mg         ADMISSION:    DIRECT Admissions Only:    · Presenting Signs/Symptoms:   ·   · Medication/treatment prior to arrival:  ·   · Past Medical History:  ·   · Clinical Exam on admission:  ·   · Vital Signs on admission: (Temp, Pulse, Resp, and BP)  ·   · Weight in kilograms:     ALL Admissions:    Admission Orders and Other Orders written within the first 24 hrs after admission  Meds Name, dose, route, time, how may doses given:  aspirin, 325 mg, Oral, Daily  heparin (porcine), 5,000 Units, Subcutaneous, Q12H [address]  lisinopril, 10 mg, Oral, Daily  magnesium oxide, 400 mg, Oral, BID  nicotine, 1 patch, Transdermal, Daily          Continuous IV Infusions:  sodium chloride 0 9 % with KCl 20 mEq/L, 125 mL/hr, Intravenous, Continuous        PRN Meds:  acetaminophen, 650 mg, Oral, Q6H PRN  ondansetron, 4 mg, Intravenous, Q6H PRN    PRN Meds Name, dose, route, time, how many doses given within the first 24 hrs :  IVs Type, rate, and total amt  ordered/given:  Labs, imaging, other:      Consults and findings:  Syncope  Assessment & Plan  · Will place on telemetry  · Trend cardiac enzymes  · Obtain serial EKGs  · Obtain orthostatic vital signs Q shift     9/8/21: positive orthostatics on my exam, will continue to replenish fluid with iv hydration, also requiring IV repletion, will need further inpatient stay  -no major events on telemetry  -trop neg x3  -orthostatic positive-continue fluid hydration     Tobacco abuse  Assessment & Plan  Will give nicotine 21 mg transdermal daily     Hyponatremia  Assessment & Plan  Will hydrate with normal saline 125 cc/hour continue to monitor with repeat labs in a m      9/16/21: with ongoing diarrhea and tachycardia, will continue fluid hydration for dehydration     Hypomagnesemia  Assessment & Plan  Patient is status post IV repletion in the ER, will continue to monitor with repeat labs in a m      9/8/21: will start on daily magnesium     Diarrhea  Assessment & Plan  · Will send stool for C diff as well as enteric panel  · Obtain CRP  · Patient will likely need evaluation by GI as an outpatient as well as colonoscopy     9/16/21: patient with 4 weeks of diarrhea, will need stool studies, reports 3 episodes this morning, no blood noted          * Hypokalemia  Assessment & Plan  · Placed in observation telemetry  · Likely secondary to GI losses  · Patient is status post p o   As well as IV repletion in the ER  · Will place on telemetry  · Will hydrate with normal saline with 20 mEq of KCl at 125 cc/hour     9/16/21: patient continues to have hypokalemia, will need further IV and PO correction  -likely secondary to ongoing diarrhea      Certification Statement: The patient will continue to require additional inpatient hospital stay due to iv fluid hydration and electrolyte replenishment    Test Results after admission  Pertinent Lab tests (dates/results):  Culture results (blood, urine, spinal, wound, respiratory, etc ):  Imaging tests (dates/results):  EKG (dates/results): Other test (dates/results):  Tests pending (dates/results):    Surgical or Invasive Procedures  Name of surgery/procedure:  Date & Time:  Patient Response:  Post-operative orders:  Operative Report/Findings:    Response to Treatment, Major Change in Condition, Major Charge in Treatment anytime during admission    Disposition on Discharge  Home, Rehab, SNF, LTC, Shelter, etc : Home/Self Care    Cease to Breathe (CTB)  If a patient expires during an admission, in addition to the above information, please include:    Summary/timeline of the patient's decline in condition:    Medications and treatment:    Patient response to treatment:    Date and time patient ceased to breathe:        Is there a Readmission that follows this admission? Yes X No    If yes, reason for denial:          InterQual Review    InterQual Criteria Met: X Yes  No  N/A        Please include the InterQual Review, InterQual year/version used, and the criteria selected:     Patient: 43Elder Hubbard   Review Number: [id number]  Review Status:  In Primary  Criteria Status: Acute Met  Day Met: Episode Day 1     Condition Specific: Yes        OUTCOMES  Outcome Type: Primary           REVIEW DETAILS     Product: [facility] [place] Adult  Subset: Electrolyte or Mineral Imbalance      (Symptom or finding within 24h)         (Excludes PO medications unless noted)          [X] Select Day, One:              [X] Episode Day 1, One:                  [X] ACUTE, >= One:                      [X] Hypokalemia, All:                          [X] Finding, One:                              [X] Potassium < 2 5 mEq/L(2 5 mmol/L)                          [X] No electrocardiogram (ECG) changes                          [X] Potassium repletion (includes PO)                      [X] Hypomagnesemia, Both:                          [X] Finding, One:                              [X] Magnesium 1 0-1 4 mg/dL(0 41-0 58 mmol/L) and symptomatic                          [X] Magnesium repletion                          ~--Admin, IQ Admin Admin on 09- 12:07 PM--~                          K 2 5 on admit                                                                                   Version: InterQual® 2020  Rosemarie Hayes and InterQual®  © 2020 [employer] and/or one of its subsidiaries  All Rights Reserved  CPT only © 2019 [employer]  All Rights Reserved  PLEASE SUBMIT THE COMPLETED FORM TO THE DEPARTMENT OF HUMAN SERVICES - DIVISION OF CLINICAL  REVIEW VIA FAX [phone] or VIA E-MAIL TO [email or web address]    Signature: Kieran Ramos Date:  10/22/21    Confidentiality Notice: The documents accompanying this telecopy may contain confidential information belonging to the sender  The information is intended only for the use of the individual named above  If you are not the intended recipient, you are hereby notified  That any disclosure, copying, distribution or taking of any telecopy is strictly prohibited
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
## 2022-06-20 ENCOUNTER — OCCMED (OUTPATIENT)
Dept: URGENT CARE | Facility: CLINIC | Age: 52
End: 2022-06-20
Payer: OTHER MISCELLANEOUS

## 2022-06-20 DIAGNOSIS — S61.411A SKIN TEAR OF RIGHT HAND WITHOUT COMPLICATION, INITIAL ENCOUNTER: Primary | ICD-10-CM

## 2022-06-20 PROCEDURE — 90715 TDAP VACCINE 7 YRS/> IM: CPT

## 2022-06-20 PROCEDURE — 99283 EMERGENCY DEPT VISIT LOW MDM: CPT

## 2022-06-20 PROCEDURE — G0382 LEV 3 HOSP TYPE B ED VISIT: HCPCS

## 2022-06-25 ENCOUNTER — APPOINTMENT (OUTPATIENT)
Dept: URGENT CARE | Facility: CLINIC | Age: 52
End: 2022-06-25
Payer: OTHER MISCELLANEOUS

## 2022-06-25 PROCEDURE — 99213 OFFICE O/P EST LOW 20 MIN: CPT | Performed by: PHYSICIAN ASSISTANT

## 2022-07-11 ENCOUNTER — TELEPHONE (OUTPATIENT)
Dept: FAMILY MEDICINE CLINIC | Facility: CLINIC | Age: 52
End: 2022-07-11

## 2022-07-11 NOTE — TELEPHONE ENCOUNTER
Called and left message for patient to call the office  She is due for a physical, mammo, and colo  Can we get this scheduled for her

## 2022-08-22 ENCOUNTER — OFFICE VISIT (OUTPATIENT)
Dept: URGENT CARE | Facility: CLINIC | Age: 52
End: 2022-08-22
Payer: COMMERCIAL

## 2022-08-22 VITALS
RESPIRATION RATE: 18 BRPM | TEMPERATURE: 97.4 F | HEART RATE: 92 BPM | BODY MASS INDEX: 18.88 KG/M2 | OXYGEN SATURATION: 100 % | WEIGHT: 100 LBS | HEIGHT: 61 IN

## 2022-08-22 DIAGNOSIS — L23.7 POISON IVY DERMATITIS: Primary | ICD-10-CM

## 2022-08-22 PROCEDURE — 99213 OFFICE O/P EST LOW 20 MIN: CPT | Performed by: NURSE PRACTITIONER

## 2022-08-22 PROCEDURE — 96372 THER/PROPH/DIAG INJ SC/IM: CPT | Performed by: NURSE PRACTITIONER

## 2022-08-22 RX ORDER — DEXAMETHASONE SODIUM PHOSPHATE 10 MG/ML
10 INJECTION, SOLUTION INTRAMUSCULAR; INTRAVENOUS ONCE
Status: COMPLETED | OUTPATIENT
Start: 2022-08-22 | End: 2022-08-22

## 2022-08-22 RX ORDER — PREDNISONE 10 MG/1
TABLET ORAL
Qty: 30 TABLET | Refills: 0 | Status: SHIPPED | OUTPATIENT
Start: 2022-08-22

## 2022-08-22 RX ADMIN — DEXAMETHASONE SODIUM PHOSPHATE 10 MG: 10 INJECTION, SOLUTION INTRAMUSCULAR; INTRAVENOUS at 12:01

## 2022-08-22 NOTE — PROGRESS NOTES
[facility]        NAME: Rand Cavazos is a 46 y o  female  : 1970    MRN: [id number]  DATE: 2022  TIME: 12:23 PM      Assessment and Plan     Poison ivy dermatitis [L23 7]  1  Poison ivy dermatitis  dexamethasone (PF) (DECADRON) injection 10 mg    predniSONE 10 mg tablet         Patient Instructions   Patient Instructions   Overlap the decadron shot (last 3 days) with the prednisone taper; both are steroids and will help suppress the rash  You may still take the benadryl as needed while taking these  See information below:  Poison Ivy   WHAT YOU NEED TO KNOW:   What is poison ivy? Poison ivy is a plant that can cause an itchy, uncomfortable rash on your skin  Poison ivy grows as a shrub or vine in woods, fields, and areas of thick [place]  It has 3 bright green leaves on each stem that turn red in [place]  What causes a poison ivy rash? A poison ivy rash can occur when the plant oil soaks into your skin  You may get a rash if you touch:  · Any part of the poison ivy plant: This includes the leaves, stem, vine, roots, flowers, and berries  · Pets with poison ivy on their fur:  They can spread poison ivy oil to your skin and to items inside your car and house  · Items with poison ivy oil on them: This includes clothing, shoes, camping or sports equipment, or outdoor tools  · A person with poison ivy oil on him:  Poison ivy oil may be on their skin or clothing  What can I do if I have been exposed to poison ivy? If you think you have touched poison ivy, rinse your skin with cool water right away  Then, wash it with soap and water  Rinse your skin well  Do not use hot water because it may cause the oil to spread on your skin  You may also put rubbing alcohol or a solution of 1/2 alcohol and 1/2 water on your skin  This may help your rash to be less severe when it breaks out on your skin  What are the signs and symptoms of a poison ivy rash?    · A red, swollen, itchy rash that develops within hours to days of exposure to poison ivy    · A rash that appears in thick patches or thin lines where the plant leaves rubbed against your skin    · Blisters that may leak clear to yellow liquid, then crust over and become scaly    How is a poison ivy rash treated? · Antiseptic or drying creams or ointments:  Your healthcare provider may recommend medicine to dry out the rash and decrease the itching  These products may be available without a doctor's order  · Steroids: This medicine helps decrease itching and inflammation  It can be given as a cream to apply to your skin or as a pill  · Antihistamines: This medicine may help decrease itching and help you sleep  It is available without a doctor's order  How can I manage my symptoms? · Keep your rash clean and dry:  Wash it with soap and water  Gently pat it dry with a clean towel  · Try not to scratch or rub your rash: This can cause your skin to become infected  · Use a compress on your rash:  Dip a clean washcloth in cool water  Wring it out and place it on your rash  Leave the washcloth on your skin for 15 minutes  Do this at least 3 times per day  · Take a cornstarch or oatmeal bath: If your rash is too large to cover with wet washcloths, take 3 or 4 cornstarch baths daily  Mix 1 pound of cornstarch with a little water to make a paste  Add the paste to a tub full of water and mix well  You may also use colloidal oatmeal in the bath water  Use lukewarm water  Avoid hot water because it may cause your itching to increase  Can a poison ivy rash be spread by scratching or touching it? You cannot spread poison ivy by touching your rash or the liquid from your blisters  Poison ivy is spread only if you scratch your skin while it still has oil on it  You may think your rash is spreading because new rashes appear over a number of days   This happens because areas covered by thin skin break out in a rash first  Your face or forearms may develop a rash before thicker areas, such as the palms of your hands  How can I prevent a poison ivy rash? · Wear skin protection:  Wear long pants, a long-sleeved shirt, and gloves  Use a skin block lotion to protect your skin from poison ivy oil  You can find this at a drugstore without a prescription  · Wash clothing after possible exposure: If you think you have been near a poison ivy plant, wash the clothes you were wearing separately from other clothes  Rinse the washing machine well after you take the clothes out  Scrub boots and shoes with warm, soapy water  Dry clean items and clothing that you cannot wash in water  Poison ivy oil is sticky and can stay on surfaces for a long time  It can cause a new rash even years later  · Bathe your pet:  Use warm water and shampoo on your pet's fur  This will prevent the spread of oil to your skin, car, and home  Wear long sleeves, long pants, and gloves while washing pets or any items that may have oil on them  · Reduce exposure to poison ivy:  Do not touch plants that look like poison ivy  Keep your yard free of poison ivy  While protecting your skin, remove the plant and the roots  Place them in a plastic bag and seal the bag tightly  · Do not burn poison ivy plants: This can spread the oil through the air  If you breathe the oil into your lungs, you could have swelling and serious breathing problems  Oil that clings to the fire [place]arita can land on your skin and cause a rash  When should I contact my healthcare provider? · You have pus, soft yellow scabs, or tenderness on the rash  · The itching gets worse or keeps you awake at night  · The rash covers more than 1/4 of your skin or spreads to your eyes, mouth, or genital area  · The rash is not better after 2 to 3 weeks  · You have tender, swollen glands on the sides of your neck  · You have swelling in your arms and legs      · You have questions or concerns about your condition or care  When should I seek immediate care or call 911? · You have a fever  · You have redness, swelling, and tenderness around the rash  · You have trouble breathing  CARE AGREEMENT:   You have the right to help plan your care  Learn about your health condition and how it may be treated  Discuss treatment options with your healthcare providers to decide what care you want to receive  You always have the right to refuse treatment  The above information is an  only  It is not intended as medical advice for individual conditions or treatments  Talk to your doctor, nurse or pharmacist before following any medical regimen to see if it is safe and effective for you  © Copyright [employer] 2022 Information is for End User's use only and may not be sold, redistributed or otherwise used for commercial purposes  All illustrations and images included in CareNotes® are the copyrighted property of [employer]  or Betsey Jackson        Follow up with PCP in 3-5 days  Proceed to  ER if symptoms worsen  Chief Complaint     Chief Complaint   Patient presents with    Rash     Poison started Wednesday very itchy              History of Present Illness     Patient accidentally contacted poison ivy and had onset of rash last Wed  She has used ivarest, benadryl and an anti-itch spray without relief  She has large areas of rash in multiple areas--b/l arms, b/l legs, groin, chest, etc   Does not remember taking steroids before  No hx DM  Review of Systems     Review of Systems   Skin: Positive for rash  All other systems reviewed and are negative          Current Medications       Current Outpatient Medications:     predniSONE 10 mg tablet, 5 tabs daily x 2 days, 4 tabs daily x 2 days, 3 tabs daily x 2 days, 2 tabs daily x 2 days, 1 tab daily x 2 days, Disp: 30 tablet, Rfl: 0    aspirin 325 mg tablet, Take 325 mg by mouth daily, Disp: , Rfl:    magnesium oxide (MAG-OX) 400 mg, Take 1 tablet (400 mg total) by mouth 2 (two) times a day for 5 days, Disp: 10 tablet, Rfl: 0    multivitamin-iron-minerals-folic acid (CENTRUM) chewable tablet, Chew 1 tablet daily, Disp: , Rfl:   No current facility-administered medications for this visit  Current Allergies     Allergies as of 08/22/2022    (No Known Allergies)              The following portions of the patient's history were reviewed and updated as appropriate: allergies, current medications, past family history, past medical history, past social history, past surgical history and problem list      Past Medical History:   Diagnosis Date    Cervical cancer ([facility] [address] )        Past Surgical History:   Procedure Laterality Date    SHOULDER SURGERY Left        History reviewed  No pertinent family history  Medications have been verified  Objective     Pulse 92   Temp (!) 97 4 °F (36 3 °C)   Resp 18   Ht 5' 1" (1 549 m)   Wt 45 4 kg (100 lb)   SpO2 100%   BMI 18 89 kg/m²   No LMP recorded  Patient has had a hysterectomy  Physical Exam     Physical Exam  Vitals and nursing note reviewed  Constitutional:       General: She is not in acute distress  Appearance: Normal appearance  She is well-developed  She is not ill-appearing, toxic-appearing or diaphoretic  HENT:      Head: Normocephalic and atraumatic  Pulmonary:      Effort: Pulmonary effort is normal  No respiratory distress  Abdominal:      General: There is no distension  Palpations: Abdomen is soft  Musculoskeletal:         General: Normal range of motion  Skin:     General: Skin is warm and dry  Capillary Refill: Capillary refill takes less than 2 seconds  Findings: Rash present  Rash is macular and papular        Comments: Poison ivy rash with multiple open, oozing moneral areas on left arm (with mild swelling near right elbow--no extreme warmth or purulent drainage), right forearm, chest, b/l legs especially dense on b/l inner thighs and groin  Neurological:      General: No focal deficit present  Mental Status: She is alert and oriented to person, place, and time  Psychiatric:         Mood and Affect: Mood normal          Behavior: Behavior normal          Thought Content:  Thought content normal          Judgment: Judgment normal

## 2022-08-22 NOTE — PATIENT INSTRUCTIONS
Overlap the decadron shot (last 3 days) with the prednisone taper; both are steroids and will help suppress the rash  You may still take the benadryl as needed while taking these  See information below:  Poison Ivy   WHAT YOU NEED TO KNOW:   What is poison ivy? Poison ivy is a plant that can cause an itchy, uncomfortable rash on your skin  Poison ivy grows as a shrub or vine in woods, fields, and areas of thick [place]  It has 3 bright green leaves on each stem that turn red in [place]  What causes a poison ivy rash? A poison ivy rash can occur when the plant oil soaks into your skin  You may get a rash if you touch: Any part of the poison ivy plant: This includes the leaves, stem, vine, roots, flowers, and berries  Pets with poison ivy on their fur:  They can spread poison ivy oil to your skin and to items inside your car and house  Items with poison ivy oil on them: This includes clothing, shoes, camping or sports equipment, or outdoor tools  A person with poison ivy oil on him:  Poison ivy oil may be on their skin or clothing  What can I do if I have been exposed to poison ivy? If you think you have touched poison ivy, rinse your skin with cool water right away  Then, wash it with soap and water  Rinse your skin well  Do not use hot water because it may cause the oil to spread on your skin  You may also put rubbing alcohol or a solution of 1/2 alcohol and 1/2 water on your skin  This may help your rash to be less severe when it breaks out on your skin  What are the signs and symptoms of a poison ivy rash? A red, swollen, itchy rash that develops within hours to days of exposure to poison ivy    A rash that appears in thick patches or thin lines where the plant leaves rubbed against your skin    Blisters that may leak clear to yellow liquid, then crust over and become scaly    How is a poison ivy rash treated?    Antiseptic or drying creams or ointments:  Your healthcare provider may recommend medicine to dry out the rash and decrease the itching  These products may be available without a doctor's order  Steroids: This medicine helps decrease itching and inflammation  It can be given as a cream to apply to your skin or as a pill  Antihistamines: This medicine may help decrease itching and help you sleep  It is available without a doctor's order  How can I manage my symptoms? Keep your rash clean and dry:  Wash it with soap and water  Gently pat it dry with a clean towel  Try not to scratch or rub your rash: This can cause your skin to become infected  Use a compress on your rash:  Dip a clean washcloth in cool water  Wring it out and place it on your rash  Leave the washcloth on your skin for 15 minutes  Do this at least 3 times per day  Take a cornstarch or oatmeal bath: If your rash is too large to cover with wet washcloths, take 3 or 4 cornstarch baths daily  Mix 1 pound of cornstarch with a little water to make a paste  Add the paste to a tub full of water and mix well  You may also use colloidal oatmeal in the bath water  Use lukewarm water  Avoid hot water because it may cause your itching to increase  Can a poison ivy rash be spread by scratching or touching it? You cannot spread poison ivy by touching your rash or the liquid from your blisters  Poison ivy is spread only if you scratch your skin while it still has oil on it  You may think your rash is spreading because new rashes appear over a number of days  This happens because areas covered by thin skin break out in a rash first  Your face or forearms may develop a rash before thicker areas, such as the palms of your hands  How can I prevent a poison ivy rash? Wear skin protection:  Wear long pants, a long-sleeved shirt, and gloves  Use a skin block lotion to protect your skin from poison ivy oil  You can find this at a drugstore without a prescription  Wash clothing after possible exposure:   If you think you have been near a poison ivy plant, wash the clothes you were wearing separately from other clothes  Rinse the washing machine well after you take the clothes out  Scrub boots and shoes with warm, soapy water  Dry clean items and clothing that you cannot wash in water  Poison ivy oil is sticky and can stay on surfaces for a long time  It can cause a new rash even years later  Bathe your pet:  Use warm water and shampoo on your pet's fur  This will prevent the spread of oil to your skin, car, and home  Wear long sleeves, long pants, and gloves while washing pets or any items that may have oil on them  Reduce exposure to poison ivy:  Do not touch plants that look like poison ivy  Keep your yard free of poison ivy  While protecting your skin, remove the plant and the roots  Place them in a plastic bag and seal the bag tightly  Do not burn poison ivy plants: This can spread the oil through the air  If you breathe the oil into your lungs, you could have swelling and serious breathing problems  Oil that clings to the fire [place] can land on your skin and cause a rash  When should I contact my healthcare provider? You have pus, soft yellow scabs, or tenderness on the rash  The itching gets worse or keeps you awake at night  The rash covers more than 1/4 of your skin or spreads to your eyes, mouth, or genital area  The rash is not better after 2 to 3 weeks  You have tender, swollen glands on the sides of your neck  You have swelling in your arms and legs  You have questions or concerns about your condition or care  When should I seek immediate care or call 911? You have a fever  You have redness, swelling, and tenderness around the rash  You have trouble breathing  CARE AGREEMENT:   You have the right to help plan your care  Learn about your health condition and how it may be treated   Discuss treatment options with your healthcare providers to decide what care you want to receive  You always have the right to refuse treatment  The above information is an  only  It is not intended as medical advice for individual conditions or treatments  Talk to your doctor, nurse or pharmacist before following any medical regimen to see if it is safe and effective for you  © Copyright [employer] 2022 Information is for End User's use only and may not be sold, redistributed or otherwise used for commercial purposes   All illustrations and images included in CareNotes® are the copyrighted property of [employer] M , Inc  or [address] [place] [address]